# Patient Record
Sex: FEMALE | Race: WHITE | NOT HISPANIC OR LATINO | Employment: PART TIME | URBAN - METROPOLITAN AREA
[De-identification: names, ages, dates, MRNs, and addresses within clinical notes are randomized per-mention and may not be internally consistent; named-entity substitution may affect disease eponyms.]

---

## 2017-02-02 ENCOUNTER — HOSPITAL ENCOUNTER (EMERGENCY)
Facility: MEDICAL CENTER | Age: 67
End: 2017-02-02
Attending: EMERGENCY MEDICINE
Payer: MEDICARE

## 2017-02-02 VITALS
TEMPERATURE: 98.1 F | DIASTOLIC BLOOD PRESSURE: 82 MMHG | RESPIRATION RATE: 14 BRPM | SYSTOLIC BLOOD PRESSURE: 152 MMHG | WEIGHT: 227.51 LBS | HEART RATE: 63 BPM | OXYGEN SATURATION: 95 % | BODY MASS INDEX: 37.86 KG/M2

## 2017-02-02 DIAGNOSIS — T78.40XA ALLERGIC REACTION, INITIAL ENCOUNTER: ICD-10-CM

## 2017-02-02 PROCEDURE — 700111 HCHG RX REV CODE 636 W/ 250 OVERRIDE (IP): Performed by: EMERGENCY MEDICINE

## 2017-02-02 PROCEDURE — 99284 EMERGENCY DEPT VISIT MOD MDM: CPT

## 2017-02-02 RX ORDER — PREDNISONE 20 MG/1
60 TABLET ORAL DAILY
Qty: 15 TAB | Refills: 0 | Status: SHIPPED | OUTPATIENT
Start: 2017-02-02 | End: 2017-02-07

## 2017-02-02 RX ORDER — PREDNISONE 20 MG/1
60 TABLET ORAL DAILY
Status: DISCONTINUED | OUTPATIENT
Start: 2017-02-02 | End: 2017-02-02 | Stop reason: HOSPADM

## 2017-02-02 RX ADMIN — PREDNISONE 60 MG: 20 TABLET ORAL at 17:32

## 2017-02-02 ASSESSMENT — PAIN SCALES - GENERAL: PAINLEVEL_OUTOF10: 0

## 2017-02-02 NOTE — ED AVS SNAPSHOT
After Visit Summary                                                                                                                Cate Niño   MRN: [id number]    Department:  [facility], Emergency Dept   Date of Visit:  2/2/2017            [facility], Emergency Dept    [address] [address]    Phone:  [phone]      You were seen by     Chandan Soares M.D.      Your Diagnosis Was     Allergic reaction, initial encounter     [id number]       These are the medications you received during your hospitalization from 02/02/2017 1536 to 02/02/2017 1743     Date/Time Order Dose Route Action    02/02/2017 1732 predniSONE (DELTASONE) tablet 60 mg 60 mg Oral Given      Follow-up Information     1. Follow up with [facility], Emergency Dept.    Specialty:  Emergency Medicine    Why:  If symptoms worsen    Contact information    [address] [address] [phone]        2. Follow up with Beth Muse M.D.. Call in 1 day.    Specialty:  Family Medicine    Contact information    [address]  [facility] [address]  [phone]          3. Schedule an appointment as soon as possible for a visit with Terry Talbot M.D..    Specialty:  Allergy    Why:  allergist    Contact information    [address]  [facility] [address]  [phone]        Medication Information     Review all of your home medications and newly ordered medications with your primary doctor and/or pharmacist as soon as possible. Follow medication instructions as directed by your doctor and/or pharmacist.     Please keep your complete medication list with you and share with your physician. Update the information when medications are discontinued, doses are changed, or new medications (including over-the-counter products) are added; and carry medication information at all times in the event of emergency situations.               Medication List      START taking these medications        Instructions    predniSONE 20 MG Tabs   Commonly known as:  DELTASONE    Take 3 Tabs by mouth every day for 5 days.   Dose:  60 mg         ASK your doctor about these medications        Instructions    * azithromycin 250 MG Tabs   Commonly known as:  ZITHROMAX    Take 2 tabs on day 1 then 1 tab on day 2-5.       * azithromycin 250 MG Tabs   Commonly known as:  ZITHROMAX    Take 1 tab per day for 4 days       benzonatate 100 MG Caps   Commonly known as:  TESSALON    Take 1 Cap by mouth 2 times a day as needed for Cough.   Dose:  100 mg       fluticasone 110 MCG/ACT Aero   Commonly known as:  FLOVENT HFA    Inhale 2 Puffs by mouth 2 times a day.   Dose:  2 Puff       ipratropium-albuterol 0.5-2.5 (3) MG/3ML nebulizer solution   Commonly known as:  DUONEB    3 mL by Nebulization route 4 times a day.   Dose:  3 mL       lansoprazole 30 MG Cpdr   Commonly known as:  PREVACID    Take 30 mg by mouth every day.   Dose:  30 mg       * Notice:  This list has 2 medication(s) that are the same as other medications prescribed for you. Read the directions carefully, and ask your doctor or other care provider to review them with you.              Discharge Instructions       Allergies  An allergy is an abnormal reaction to a substance by the body's defense system (immune system). Allergies can develop at any age.  WHAT CAUSES ALLERGIES?  An allergic reaction happens when the immune system mistakenly reacts to a normally harmless substance, called an allergen, as if it were harmful. The immune system releases antibodies to fight the substance. Antibodies eventually release a chemical called histamine into the bloodstream. The release of histamine is meant to protect the body from infection, but it also causes discomfort.  An allergic reaction can be triggered by:  · Eating an allergen.  · Inhaling an allergen.  · Touching an allergen.  WHAT TYPES OF ALLERGIES ARE THERE?  There are many types  of allergies. Common types include:  · Seasonal allergies. People with this type of allergy are usually allergic to substances that are only present during certain seasons, such as molds and pollens.  · Food allergies.  · Drug allergies.  · Insect allergies.  · Animal dander allergies.  WHAT ARE SYMPTOMS OF ALLERGIES?  Possible allergy symptoms include:  · Swelling of the lips, face, tongue, mouth, or throat.  · Sneezing, coughing, or wheezing.  · Nasal congestion.  · Tingling in the mouth.  · Rash.  · Itching.  · Itchy, red, swollen areas of skin (hives).  · Watery eyes.  · Vomiting.  · Diarrhea.  · Dizziness.  · Lightheadedness.  · Fainting.  · Trouble breathing or swallowing.  · Chest tightness.  · Rapid heartbeat.  HOW ARE ALLERGIES DIAGNOSED?  Allergies are diagnosed with a medical and family history and one or more of the following:  · Skin tests.  · Blood tests.  · A food diary. A food diary is a record of all the foods and drinks you have in a day and of all the symptoms you experience.  · The results of an elimination diet. An elimination diet involves eliminating foods from your diet and then adding them back in one by one to find out if a certain food causes an allergic reaction.  HOW ARE ALLERGIES TREATED?  There is no cure for allergies, but allergic reactions can be treated with medicine. Severe reactions usually need to be treated at a hospital.  HOW CAN REACTIONS BE PREVENTED?  The best way to prevent an allergic reaction is by avoiding the substance you are allergic to. Allergy shots and medicines can also help prevent reactions in some cases. People with severe allergic reactions may be able to prevent a life-threatening reaction called anaphylaxis with a medicine given right after exposure to the allergen.     This information is not intended to replace advice given to you by your health care provider. Make sure you discuss any questions you have with your health care provider.     Document  Released: 03/12/2004 Document Revised: 01/08/2016 Document Reviewed: 09/29/2015  Elsevier Interactive Patient Education ©2016 Elsevier Inc.            Patient Information     Patient Information    Following emergency treatment: all patient requiring follow-up care must return either to a private physician or a clinic if your condition worsens before you are able to obtain further medical attention, please return to the emergency room.     Billing Information    At [facility], we work to make the billing process streamlined for our patients.  Our Representatives are here to answer any questions you may have regarding your hospital bill.  If you have insurance coverage and have supplied your insurance information to us, we will submit a claim to your insurer on your behalf.  Should you have any questions regarding your bill, we can be reached online or by phone as follows:  Online: You are able pay your bills online or live chat with our representatives about any billing questions you may have. We are here to help Monday - Friday from 8:00am to 7:30pm and 9:00am - 12:00pm on Saturdays.  Please visit [email or web address]  for more information.   Phone:  [phone] or [phone]    Please note that your emergency physician, surgeon, pathologist, radiologist, anesthesiologist, and other specialists are not employed by [facility] and will therefore bill separately for their services.  Please contact them directly for any questions concerning their bills at the numbers below:     Emergency Physician Services:  [phone]  [facility]:  [phone]  [facility]:  [phone]  [facility]:  [phone]    1. Your final bill may vary from the amount quoted upon discharge if all procedures are not complete at that time, or if your doctor has additional procedures of which we are not aware. You will receive an additional bill if you  return to the Emergency Department at Critical access hospital for suture removal regardless of the facility of which the sutures were placed.     2. Please arrange for settlement of this account at the emergency registration.    3. All self-pay accounts are due in full at the time of treatment.  If you are unable to meet this obligation then payment is expected within 4-5 days.     4. If you have had radiology studies (CT, X-ray, Ultrasound, MRI), you have received a preliminary result during your emergency department visit. Please contact the radiology department [phone] to receive a copy of your final result. Please discuss the Final result with your primary physician or with the follow up physician provided.     Crisis Hotline:  [place] Crisis Hotline:  7-144-WIMLUIP or 1-497.980.4837  [place] Crisis Hotline:    1-971.597.4599 or 352-674-5188         ED Discharge Follow Up Questions    1. In order to provide you with very good care, we would like to follow up with a phone call in the next few days.  May we have your permission to contact you?     YES /  NO    2. What is the best phone number to call you? (       )_____-__________    3. What is the best time to call you?      Morning  /  Afternoon  /  Evening                   Patient Signature:  ____________________________________________________________    Date:  ____________________________________________________________      Your appointments     Feb 03, 2017 10:00 AM   FLOYD LUIN10 with RB MG 2   [facility] ([address])    [address] [place] [id number]   [phone]           No deodorant, powder, perfume or lotion under the arm or breast area.            Feb 18, 2017  8:30 AM   Adult Draw/Collection with [facility]   [facility] (--)    [address] [place] [address]   [phone]

## 2017-02-02 NOTE — ED NOTES
Pt to triage with rash and lip swelling for weeks since taking Motrin daily. Pt advised to return to triage nurse for any changes or concerns.

## 2017-02-02 NOTE — ED AVS SNAPSHOT
[employer] Access Code: Activation code not generated  Current [employer] Status: Patient Declined    Your email address is not on file at [facility].  Email Addresses are required for you to sign up for [employer], please contact [phone] to verify your personal information and to provide your email address prior to attempting to register for [employer].    Cate JUAN Niño  [address] [place] [address]    [employer]  A secure, online tool to manage your health information     [facility]’s [employer]® is a secure, online tool that connects you to your personalized health information from the privacy of your home -- day or night - making it very easy for you to manage your healthcare. Once the activation process is completed, you can even access your medical information using the [employer] alesha, which is available for free in the Apple Alesha store or Google Play store.     To learn more about Quantum Group, visit [email or web address]    There are two levels of access available (as shown below):   My Chart Features  [facility] Primary Care Doctor [facility]  Specialists [facility]  Urgent  Care Non-[facility] Primary Care Doctor   Email your healthcare team securely and privately 24/7 X X X    Manage appointments: schedule your next appointment; view details of past/upcoming appointments X      Request prescription refills. X      View recent personal medical records, including lab and immunizations X X X X   View health record, including health history, allergies, medications X X X X   Read reports about your outpatient visits, procedures, consult and ER notes X X X X   See your discharge summary, which is a recap of your hospital and/or ER visit that includes your diagnosis, lab results, and care plan X X  X     How to register for [employer]:  Once your e-mail address has been verified, follow the following steps to sign up for [employer].     1. Go to  [email or web address]  2. Click on the Sign Up Now box, which takes you to the New  Member Sign Up page. You will need to provide the following information:  a. Enter your [employer] Access Code exactly as it appears at the top of this page. (You will not need to use this code after you’ve completed the sign-up process. If you do not sign up before the expiration date, you must request a new code.)   b. Enter your date of birth.   c. Enter your home email address.   d. Click Submit, and follow the next screen’s instructions.  3. Create a [employer]ia Her[employer] ID. This will be your [employer] login ID and cannot be changed, so think of one that is secure and easy to remember.  4. Create a [employer] password. You can change your password at any time.  5. Enter your Password Reset Question and Answer. This can be used at a later time if you forget your password.   6. Enter your e-mail address. This allows you to receive e-mail notifications when new information is available in [employer].  7. Click Sign Up. You can now view your health information.    For assistance activating your [employer] account, call [phone]

## 2017-02-02 NOTE — ED AVS SNAPSHOT
2/2/2017          Cate Niño  [address] [facility] [address]    Dear Cate:    [facility] wants to ensure your discharge home is safe and you or your loved ones have had all your questions answered regarding your care after you leave the hospital.    You may receive a telephone call within two days of your discharge.  This call is to make certain you understand your discharge instructions as well as ensure we provided you with the best care possible during your stay with us.     The call will only last approximately 3-5 minutes and will be done by a nurse.    Once again, we want to ensure your discharge home is safe and that you have a clear understanding of any next steps in your care.  If you have any questions or concerns, please do not hesitate to contact us, we are here for you.  Thank you for choosing [facility] for your healthcare needs.    Sincerely,    Ace Celestin    [facility]

## 2017-02-03 ENCOUNTER — HOSPITAL ENCOUNTER (OUTPATIENT)
Dept: RADIOLOGY | Facility: MEDICAL CENTER | Age: 67
End: 2017-02-03
Attending: FAMILY MEDICINE
Payer: MEDICARE

## 2017-02-03 DIAGNOSIS — Z12.31 SCREENING MAMMOGRAM, ENCOUNTER FOR: ICD-10-CM

## 2017-02-03 PROCEDURE — 77063 BREAST TOMOSYNTHESIS BI: CPT

## 2017-02-03 NOTE — ED PROVIDER NOTES
ED Provider Note    Scribed for Chandan Soares M.D. by Lauren Valencia. 2/2/2017, 5:20 PM.    Primary care provider: Beth Muse M.D.  Means of arrival: walk in   History obtained from: Patient  History limited by: None    CHIEF COMPLAINT  Chief Complaint   Patient presents with   • Medication Reaction   • Lip Swelling     HPI  Cate Niño is a 66 y.o. female who presents to the Emergency Department for lip swelling onset 1pm and a rash that onset three weeks ago. The patient was sent to the ED by her MD today.  She notes that her rash is very itchy and constant. The patient was unsure what was causing her rash and swelling. She denies any shortness of breath, nausea or vomiting. She notes that she worked on eliminating new soaps and detergents, but this has not helped her rash subside.  The only thing she notes taking on a daily basis is Motrin but she states that there was no bottle change for three months.  She denies any new foods or past allergic reactions.     REVIEW OF SYSTEMS  Pertinent positives include rash, lip swelling.   Pertinent negatives include no shortness of breath, nausea, or vomiting.      PAST MEDICAL HISTORY    No chronic medical problems    SURGICAL HISTORY  patient denies any surgical history    SOCIAL HISTORY  Social History   Substance Use Topics   • Smoking status: Never Smoker    • Alcohol Use: No      History   Drug Use No     FAMILY HISTORY  No family history noted    CURRENT MEDICATIONS  No current facility-administered medications on file prior to encounter.     Current Outpatient Prescriptions on File Prior to Encounter   Medication Sig Dispense Refill   • azithromycin (ZITHROMAX) 250 MG TABS Take 2 tabs on day 1 then 1 tab on day 2-5. 6 Tab 0   • benzonatate (TESSALON) 100 MG CAPS Take 1 Cap by mouth 2 times a day as needed for Cough. 20 Cap 0   • azithromycin (ZITHROMAX) 250 MG TABS Take 1 tab per day for 4 days 4 Tab 0   • lansoprazole (PREVACID) 30 MG  CPDR Take 30 mg by mouth every day.     • ipratropium-albuterol (DUONEB) 0.5-2.5 (3) MG/3ML nebulizer solution 3 mL by Nebulization route 4 times a day.     • fluticasone (FLOVENT HFA) 110 MCG/ACT AERO Inhale 2 Puffs by mouth 2 times a day.       ALLERGIES  No Known Allergies    PHYSICAL EXAM  VITAL SIGNS: /69 mmHg  Pulse 75  Temp(Src) 36.4 °C (97.5 °F)  Resp 16  Wt 103.2 kg (227 lb 8.2 oz)  SpO2 95%    Nursing note and vitals reviewed.  Constitutional: No distress.   HENT: Head is atraumatic. Oropharynx is moist. Minimal swelling upper lip, no lower lip swelling, no tongue swelling  Eyes: Conjunctivae are normal. Pupils are equal, round, and reactive to light.   Cardiovascular: Normal peripheral perfusion. Regular rate and rhythm, no audible murmur  Respiratory: No respiratory distress. Clear to auscultation bilaterally. No rales, rhonchi, or wheezing.  Musculoskeletal: Normal range of motion.   Neurological: Alert. No focal deficits noted.    Skin:  Scattered urticaria about the extremities, torso and upper thighs  Psych: Appropriate for clinical situation     COURSE & MEDICAL DECISION MAKING  Nursing notes, VS, PMSFHx reviewed in chart.    5:20 PM - Patient seen and examined at bedside. Patient will be treated with Deltasone 60mg tablet.  I discussed plans for discharge with a prescription for Deltasone 20mg tablet. She was given a referral to follow up with her primary care provider and an allergist and instructed to return to the ED if her symptoms worsen. Patient understands and agrees. Her vitals prior to discharge are: /69 mmHg  Pulse 75  Temp(Src) 36.4 °C (97.5 °F)  Resp 16  Wt 103.2 kg (227 lb 8.2 oz)  SpO2 95%    The patient will return for new or worsening symptoms and is stable at the time of discharge.    The patient is referred to a primary physician for blood pressure management, diabetic screening, and for all other preventative health concerns.    DISPOSITION:  Patient will be  discharged home in stable condition.    FOLLOW UP:  [facility], Emergency Dept  [address]  [place] [id number] [phone]    If symptoms worsen    Beth Muse M.D.  [address]  [address]  [facility] [address]  [phone]    Call in 1 day      Terry Talbot M.D.  [address]  [facility] [address]  [phone]    Schedule an appointment as soon as possible for a visit  allergist      OUTPATIENT MEDICATIONS:  New Prescriptions    PREDNISONE (DELTASONE) 20 MG TAB    Take 3 Tabs by mouth every day for 5 days.     The patient was discharged home with an information sheet on allergic reaction and told to return immediately for any signs or symptoms listed.  The patient agreed to the discharge precautions and follow-up plan which is documented in EPIC.    FINAL IMPRESSION  1. Allergic reaction, initial encounter         The note accurately reflects work and decisions made by me.  Chandan Soares  2/2/2017  6:43 PM

## 2017-02-03 NOTE — DISCHARGE INSTRUCTIONS
Allergies  An allergy is an abnormal reaction to a substance by the body's defense system (immune system). Allergies can develop at any age.  WHAT CAUSES ALLERGIES?  An allergic reaction happens when the immune system mistakenly reacts to a normally harmless substance, called an allergen, as if it were harmful. The immune system releases antibodies to fight the substance. Antibodies eventually release a chemical called histamine into the bloodstream. The release of histamine is meant to protect the body from infection, but it also causes discomfort.  An allergic reaction can be triggered by:  · Eating an allergen.  · Inhaling an allergen.  · Touching an allergen.  WHAT TYPES OF ALLERGIES ARE THERE?  There are many types of allergies. Common types include:  · Seasonal allergies. People with this type of allergy are usually allergic to substances that are only present during certain seasons, such as molds and pollens.  · Food allergies.  · Drug allergies.  · Insect allergies.  · Animal dander allergies.  WHAT ARE SYMPTOMS OF ALLERGIES?  Possible allergy symptoms include:  · Swelling of the lips, face, tongue, mouth, or throat.  · Sneezing, coughing, or wheezing.  · Nasal congestion.  · Tingling in the mouth.  · Rash.  · Itching.  · Itchy, red, swollen areas of skin (hives).  · Watery eyes.  · Vomiting.  · Diarrhea.  · Dizziness.  · Lightheadedness.  · Fainting.  · Trouble breathing or swallowing.  · Chest tightness.  · Rapid heartbeat.  HOW ARE ALLERGIES DIAGNOSED?  Allergies are diagnosed with a medical and family history and one or more of the following:  · Skin tests.  · Blood tests.  · A food diary. A food diary is a record of all the foods and drinks you have in a day and of all the symptoms you experience.  · The results of an elimination diet. An elimination diet involves eliminating foods from your diet and then adding them back in one by one to find out if a certain food causes an allergic reaction.  HOW ARE  ALLERGIES TREATED?  There is no cure for allergies, but allergic reactions can be treated with medicine. Severe reactions usually need to be treated at a hospital.  HOW CAN REACTIONS BE PREVENTED?  The best way to prevent an allergic reaction is by avoiding the substance you are allergic to. Allergy shots and medicines can also help prevent reactions in some cases. People with severe allergic reactions may be able to prevent a life-threatening reaction called anaphylaxis with a medicine given right after exposure to the allergen.     This information is not intended to replace advice given to you by your health care provider. Make sure you discuss any questions you have with your health care provider.     Document Released: 03/12/2004 Document Revised: 01/08/2016 Document Reviewed: 09/29/2015  [employer] Interactive Patient Education ©2016 Elsevier Inc.

## 2017-02-03 NOTE — PROGRESS NOTES
Pt left unit walking independently; Personal belongings with pt when leaving unit. Pt given discharge instructions prior to leaving unit including prescription and when to visit with physician; Encouraged to return to ED if symptoms worsen; pt  verbalizes understanding. Copy of discharge instructions with pt and in the chart.

## 2017-02-18 ENCOUNTER — HOSPITAL ENCOUNTER (OUTPATIENT)
Dept: LAB | Facility: MEDICAL CENTER | Age: 67
End: 2017-02-18
Attending: FAMILY MEDICINE
Payer: MEDICARE

## 2017-02-18 LAB
ALBUMIN SERPL BCP-MCNC: 3.7 G/DL (ref 3.2–4.9)
ALBUMIN/GLOB SERPL: 1.2 G/DL
ALP SERPL-CCNC: 62 U/L (ref 30–99)
ALT SERPL-CCNC: 16 U/L (ref 2–50)
ANION GAP SERPL CALC-SCNC: 9 MMOL/L (ref 0–11.9)
APPEARANCE UR: CLEAR
AST SERPL-CCNC: 16 U/L (ref 12–45)
BASOPHILS # BLD AUTO: 0.05 K/UL (ref 0–0.12)
BASOPHILS NFR BLD AUTO: 0.8 % (ref 0–1.8)
BILIRUB SERPL-MCNC: 0.8 MG/DL (ref 0.1–1.5)
BILIRUB UR QL STRIP.AUTO: NEGATIVE
BUN SERPL-MCNC: 17 MG/DL (ref 8–22)
CALCIUM SERPL-MCNC: 9 MG/DL (ref 8.5–10.5)
CHLORIDE SERPL-SCNC: 108 MMOL/L (ref 96–112)
CHOLEST SERPL-MCNC: 221 MG/DL (ref 100–199)
CO2 SERPL-SCNC: 26 MMOL/L (ref 20–33)
COLOR UR AUTO: ABNORMAL
CREAT SERPL-MCNC: 0.68 MG/DL (ref 0.5–1.4)
CULTURE IF INDICATED INDCX: YES UA CULTURE
EOSINOPHIL # BLD: 0 K/UL (ref 0–0.51)
EOSINOPHIL NFR BLD AUTO: 0 % (ref 0–6.9)
EPITHELIAL CELLS 1715: ABNORMAL /HPF
ERYTHROCYTE [DISTWIDTH] IN BLOOD BY AUTOMATED COUNT: 40.4 FL (ref 35.9–50)
GLOBULIN SER CALC-MCNC: 3.1 G/DL (ref 1.9–3.5)
GLUCOSE SERPL-MCNC: 95 MG/DL (ref 65–99)
GLUCOSE UR STRIP.AUTO-MCNC: NEGATIVE MG/DL
HCT VFR BLD AUTO: 45.4 % (ref 37–47)
HDLC SERPL-MCNC: 48 MG/DL
HGB BLD-MCNC: 14.6 G/DL (ref 12–16)
IMM GRANULOCYTES # BLD AUTO: 0.03 K/UL (ref 0–0.11)
IMM GRANULOCYTES NFR BLD AUTO: 0.5 % (ref 0–0.9)
KETONES UR STRIP.AUTO-MCNC: NEGATIVE MG/DL
LDLC SERPL CALC-MCNC: 141 MG/DL
LEUKOCYTE ESTERASE UR QL STRIP.AUTO: ABNORMAL
LYMPHOCYTES # BLD: 2.23 K/UL (ref 1–4.8)
LYMPHOCYTES NFR BLD AUTO: 36 % (ref 22–41)
MCH RBC QN AUTO: 29.7 PG (ref 27–33)
MCHC RBC AUTO-ENTMCNC: 32.2 G/DL (ref 33.6–35)
MCV RBC AUTO: 92.3 FL (ref 81.4–97.8)
MICRO URNS: ABNORMAL
MONOCYTES # BLD: 0.5 K/UL (ref 0–0.85)
MONOCYTES NFR BLD AUTO: 8.1 % (ref 0–13.4)
NEUTROPHILS # BLD: 3.38 K/UL (ref 2–7.15)
NEUTROPHILS NFR BLD AUTO: 54.6 % (ref 44–72)
NITRITE UR QL STRIP.AUTO: NEGATIVE
NRBC # BLD AUTO: 0 K/UL
NRBC BLD-RTO: 0 /100 WBC
PH UR: 6.5 [PH]
PLATELET # BLD AUTO: 245 K/UL (ref 164–446)
PMV BLD AUTO: 9.6 FL (ref 9–12.9)
POTASSIUM SERPL-SCNC: 4.1 MMOL/L (ref 3.6–5.5)
PROT SERPL-MCNC: 6.8 G/DL (ref 6–8.2)
PROT UR QL STRIP: NEGATIVE MG/DL
RBC # BLD AUTO: 4.92 M/UL (ref 4.2–5.4)
RBC #/AREA URNS HPF: ABNORMAL /HPF
RBC UR QL AUTO: NEGATIVE
SODIUM SERPL-SCNC: 143 MMOL/L (ref 135–145)
SP GR UR STRIP.AUTO: 1.02
TRIGL SERPL-MCNC: 158 MG/DL (ref 0–149)
TSH SERPL DL<=0.005 MIU/L-ACNC: 3 UIU/ML (ref 0.3–3.7)
WBC # BLD AUTO: 6.2 K/UL (ref 4.8–10.8)
WBC #/AREA URNS HPF: ABNORMAL /HPF

## 2017-02-18 PROCEDURE — 87086 URINE CULTURE/COLONY COUNT: CPT

## 2017-02-18 PROCEDURE — 85025 COMPLETE CBC W/AUTO DIFF WBC: CPT

## 2017-02-18 PROCEDURE — 84443 ASSAY THYROID STIM HORMONE: CPT

## 2017-02-18 PROCEDURE — 80061 LIPID PANEL: CPT

## 2017-02-18 PROCEDURE — 81001 URINALYSIS AUTO W/SCOPE: CPT

## 2017-02-18 PROCEDURE — 80053 COMPREHEN METABOLIC PANEL: CPT

## 2017-02-18 PROCEDURE — 36415 COLL VENOUS BLD VENIPUNCTURE: CPT

## 2017-02-20 LAB
BACTERIA UR CULT: ABNORMAL
BACTERIA UR CULT: ABNORMAL
SIGNIFICANT IND 70042: ABNORMAL
SOURCE SOURCE: ABNORMAL

## 2017-03-27 ENCOUNTER — HOSPITAL ENCOUNTER (OUTPATIENT)
Dept: LAB | Facility: MEDICAL CENTER | Age: 67
End: 2017-03-27
Attending: NURSE PRACTITIONER
Payer: MEDICARE

## 2017-03-27 LAB
ALBUMIN SERPL BCP-MCNC: 4.1 G/DL (ref 3.2–4.9)
ALBUMIN/GLOB SERPL: 1.1 G/DL
ALP SERPL-CCNC: 69 U/L (ref 30–99)
ALT SERPL-CCNC: 15 U/L (ref 2–50)
ANION GAP SERPL CALC-SCNC: 8 MMOL/L (ref 0–11.9)
APPEARANCE UR: CLEAR
AST SERPL-CCNC: 16 U/L (ref 12–45)
BASOPHILS # BLD AUTO: 0.03 K/UL (ref 0–0.12)
BASOPHILS NFR BLD AUTO: 0.4 % (ref 0–1.8)
BILIRUB SERPL-MCNC: 0.7 MG/DL (ref 0.1–1.5)
BILIRUB UR QL STRIP.AUTO: NEGATIVE
BUN SERPL-MCNC: 17 MG/DL (ref 8–22)
CALCIUM SERPL-MCNC: 9.6 MG/DL (ref 8.5–10.5)
CHLORIDE SERPL-SCNC: 103 MMOL/L (ref 96–112)
CO2 SERPL-SCNC: 25 MMOL/L (ref 20–33)
COLOR UR AUTO: NORMAL
CREAT SERPL-MCNC: 0.73 MG/DL (ref 0.5–1.4)
CULTURE IF INDICATED INDCX: NO UA CULTURE
EOSINOPHIL # BLD: 0 K/UL (ref 0–0.51)
EOSINOPHIL NFR BLD AUTO: 0 % (ref 0–6.9)
ERYTHROCYTE [DISTWIDTH] IN BLOOD BY AUTOMATED COUNT: 40.2 FL (ref 35.9–50)
ERYTHROCYTE [SEDIMENTATION RATE] IN BLOOD BY WESTERGREN METHOD: 22 MM/HOUR (ref 0–30)
GLOBULIN SER CALC-MCNC: 3.9 G/DL (ref 1.9–3.5)
GLUCOSE SERPL-MCNC: 92 MG/DL (ref 65–99)
GLUCOSE UR STRIP.AUTO-MCNC: NEGATIVE MG/DL
HCT VFR BLD AUTO: 47.7 % (ref 37–47)
HGB BLD-MCNC: 15.2 G/DL (ref 12–16)
IMM GRANULOCYTES # BLD AUTO: 0.03 K/UL (ref 0–0.11)
IMM GRANULOCYTES NFR BLD AUTO: 0.4 % (ref 0–0.9)
KETONES UR STRIP.AUTO-MCNC: NEGATIVE MG/DL
LEUKOCYTE ESTERASE UR QL STRIP.AUTO: NEGATIVE
LYMPHOCYTES # BLD: 2.17 K/UL (ref 1–4.8)
LYMPHOCYTES NFR BLD AUTO: 27.9 % (ref 22–41)
MCH RBC QN AUTO: 29.5 PG (ref 27–33)
MCHC RBC AUTO-ENTMCNC: 31.9 G/DL (ref 33.6–35)
MCV RBC AUTO: 92.4 FL (ref 81.4–97.8)
MICRO URNS: NORMAL
MONOCYTES # BLD: 0.62 K/UL (ref 0–0.85)
MONOCYTES NFR BLD AUTO: 8 % (ref 0–13.4)
NEUTROPHILS # BLD: 4.94 K/UL (ref 2–7.15)
NEUTROPHILS NFR BLD AUTO: 63.3 % (ref 44–72)
NITRITE UR QL STRIP.AUTO: NEGATIVE
NRBC # BLD AUTO: 0 K/UL
NRBC BLD-RTO: 0 /100 WBC
PH UR: 6 [PH]
PLATELET # BLD AUTO: 287 K/UL (ref 164–446)
PMV BLD AUTO: 9.4 FL (ref 9–12.9)
POTASSIUM SERPL-SCNC: 3.8 MMOL/L (ref 3.6–5.5)
PROT SERPL-MCNC: 8 G/DL (ref 6–8.2)
PROT UR QL STRIP: NEGATIVE MG/DL
RBC # BLD AUTO: 5.16 M/UL (ref 4.2–5.4)
RBC UR QL AUTO: NEGATIVE
SODIUM SERPL-SCNC: 136 MMOL/L (ref 135–145)
SP GR UR STRIP.AUTO: 1.01
TSH SERPL DL<=0.005 MIU/L-ACNC: 3.15 UIU/ML (ref 0.3–3.7)
WBC # BLD AUTO: 7.8 K/UL (ref 4.8–10.8)

## 2017-03-27 PROCEDURE — 36415 COLL VENOUS BLD VENIPUNCTURE: CPT

## 2017-03-27 PROCEDURE — 86225 DNA ANTIBODY NATIVE: CPT

## 2017-03-27 PROCEDURE — 81003 URINALYSIS AUTO W/O SCOPE: CPT

## 2017-03-27 PROCEDURE — 86235 NUCLEAR ANTIGEN ANTIBODY: CPT | Mod: 91

## 2017-03-27 PROCEDURE — 85652 RBC SED RATE AUTOMATED: CPT

## 2017-03-27 PROCEDURE — 85025 COMPLETE CBC W/AUTO DIFF WBC: CPT

## 2017-03-27 PROCEDURE — 86038 ANTINUCLEAR ANTIBODIES: CPT

## 2017-03-27 PROCEDURE — 84443 ASSAY THYROID STIM HORMONE: CPT

## 2017-03-27 PROCEDURE — 80053 COMPREHEN METABOLIC PANEL: CPT

## 2017-03-29 LAB — NUCLEAR IGG SER QL IA: NORMAL

## 2017-05-24 ENCOUNTER — HOSPITAL ENCOUNTER (OUTPATIENT)
Dept: RADIOLOGY | Facility: MEDICAL CENTER | Age: 67
End: 2017-05-24
Attending: FAMILY MEDICINE
Payer: MEDICARE

## 2017-05-24 DIAGNOSIS — S40.852S: ICD-10-CM

## 2017-05-24 DIAGNOSIS — L08.9: ICD-10-CM

## 2017-05-25 ENCOUNTER — HOSPITAL ENCOUNTER (OUTPATIENT)
Dept: RADIOLOGY | Facility: MEDICAL CENTER | Age: 67
End: 2017-05-25
Attending: FAMILY MEDICINE
Payer: MEDICARE

## 2017-05-25 DIAGNOSIS — L08.9: ICD-10-CM

## 2017-05-25 DIAGNOSIS — S40.852S: ICD-10-CM

## 2017-05-25 PROCEDURE — 73080 X-RAY EXAM OF ELBOW: CPT | Mod: LT

## 2017-07-25 ENCOUNTER — HOSPITAL ENCOUNTER (OUTPATIENT)
Dept: RADIOLOGY | Facility: MEDICAL CENTER | Age: 67
End: 2017-07-25
Attending: FAMILY MEDICINE
Payer: MEDICARE

## 2017-07-25 ENCOUNTER — HOSPITAL ENCOUNTER (OUTPATIENT)
Facility: MEDICAL CENTER | Age: 67
End: 2017-07-25
Attending: FAMILY MEDICINE
Payer: MEDICARE

## 2017-07-25 DIAGNOSIS — M54.5 LOW BACK PAIN, UNSPECIFIED BACK PAIN LATERALITY, UNSPECIFIED CHRONICITY, WITH SCIATICA PRESENCE UNSPECIFIED: ICD-10-CM

## 2017-07-25 LAB — CYTOLOGY REG CYTOL: NORMAL

## 2017-07-25 PROCEDURE — 72110 X-RAY EXAM L-2 SPINE 4/>VWS: CPT

## 2017-07-25 PROCEDURE — 88175 CYTOPATH C/V AUTO FLUID REDO: CPT

## 2017-08-02 ENCOUNTER — HOSPITAL ENCOUNTER (OUTPATIENT)
Dept: RADIOLOGY | Facility: MEDICAL CENTER | Age: 67
End: 2017-08-02
Attending: FAMILY MEDICINE
Payer: MEDICARE

## 2017-08-02 DIAGNOSIS — M25.522 LEFT ELBOW PAIN: ICD-10-CM

## 2017-08-02 PROCEDURE — 76882 US LMTD JT/FCL EVL NVASC XTR: CPT | Mod: LT

## 2018-10-03 ENCOUNTER — HOSPITAL ENCOUNTER (OUTPATIENT)
Dept: RADIOLOGY | Facility: MEDICAL CENTER | Age: 68
End: 2018-10-03
Attending: FAMILY MEDICINE
Payer: MEDICARE

## 2018-10-03 ENCOUNTER — HOSPITAL ENCOUNTER (OUTPATIENT)
Dept: LAB | Facility: MEDICAL CENTER | Age: 68
End: 2018-10-03
Attending: FAMILY MEDICINE
Payer: MEDICARE

## 2018-10-03 DIAGNOSIS — Z78.0 POST-MENOPAUSAL: ICD-10-CM

## 2018-10-03 DIAGNOSIS — Z12.31 VISIT FOR SCREENING MAMMOGRAM: ICD-10-CM

## 2018-10-03 LAB
25(OH)D3 SERPL-MCNC: 16 NG/ML (ref 30–100)
ALBUMIN SERPL BCP-MCNC: 4.2 G/DL (ref 3.2–4.9)
ALBUMIN/GLOB SERPL: 1.2 G/DL
ALP SERPL-CCNC: 69 U/L (ref 30–99)
ALT SERPL-CCNC: 26 U/L (ref 2–50)
ANION GAP SERPL CALC-SCNC: 6 MMOL/L (ref 0–11.9)
AST SERPL-CCNC: 25 U/L (ref 12–45)
BASOPHILS # BLD AUTO: 0.3 % (ref 0–1.8)
BASOPHILS # BLD: 0.02 K/UL (ref 0–0.12)
BILIRUB SERPL-MCNC: 0.9 MG/DL (ref 0.1–1.5)
BUN SERPL-MCNC: 17 MG/DL (ref 8–22)
CALCIUM SERPL-MCNC: 9.7 MG/DL (ref 8.5–10.5)
CHLORIDE SERPL-SCNC: 106 MMOL/L (ref 96–112)
CHOLEST SERPL-MCNC: 217 MG/DL (ref 100–199)
CO2 SERPL-SCNC: 27 MMOL/L (ref 20–33)
CREAT SERPL-MCNC: 0.72 MG/DL (ref 0.5–1.4)
EOSINOPHIL # BLD AUTO: 0 K/UL (ref 0–0.51)
EOSINOPHIL NFR BLD: 0 % (ref 0–6.9)
ERYTHROCYTE [DISTWIDTH] IN BLOOD BY AUTOMATED COUNT: 42.3 FL (ref 35.9–50)
EST. AVERAGE GLUCOSE BLD GHB EST-MCNC: 126 MG/DL
FASTING STATUS PATIENT QL REPORTED: NORMAL
GLOBULIN SER CALC-MCNC: 3.6 G/DL (ref 1.9–3.5)
GLUCOSE SERPL-MCNC: 92 MG/DL (ref 65–99)
HBA1C MFR BLD: 6 % (ref 0–5.6)
HCT VFR BLD AUTO: 45.5 % (ref 37–47)
HDLC SERPL-MCNC: 51 MG/DL
HGB BLD-MCNC: 14.7 G/DL (ref 12–16)
IMM GRANULOCYTES # BLD AUTO: 0.03 K/UL (ref 0–0.11)
IMM GRANULOCYTES NFR BLD AUTO: 0.5 % (ref 0–0.9)
LDLC SERPL CALC-MCNC: 140 MG/DL
LYMPHOCYTES # BLD AUTO: 1.84 K/UL (ref 1–4.8)
LYMPHOCYTES NFR BLD: 30.7 % (ref 22–41)
MCH RBC QN AUTO: 30.2 PG (ref 27–33)
MCHC RBC AUTO-ENTMCNC: 32.3 G/DL (ref 33.6–35)
MCV RBC AUTO: 93.4 FL (ref 81.4–97.8)
MONOCYTES # BLD AUTO: 0.5 K/UL (ref 0–0.85)
MONOCYTES NFR BLD AUTO: 8.3 % (ref 0–13.4)
NEUTROPHILS # BLD AUTO: 3.61 K/UL (ref 2–7.15)
NEUTROPHILS NFR BLD: 60.2 % (ref 44–72)
NRBC # BLD AUTO: 0 K/UL
NRBC BLD-RTO: 0 /100 WBC
PLATELET # BLD AUTO: 260 K/UL (ref 164–446)
PMV BLD AUTO: 9.8 FL (ref 9–12.9)
POTASSIUM SERPL-SCNC: 4.2 MMOL/L (ref 3.6–5.5)
PROT SERPL-MCNC: 7.8 G/DL (ref 6–8.2)
RBC # BLD AUTO: 4.87 M/UL (ref 4.2–5.4)
SODIUM SERPL-SCNC: 139 MMOL/L (ref 135–145)
TRIGL SERPL-MCNC: 131 MG/DL (ref 0–149)
TSH SERPL DL<=0.005 MIU/L-ACNC: 3.32 UIU/ML (ref 0.38–5.33)
WBC # BLD AUTO: 6 K/UL (ref 4.8–10.8)

## 2018-10-03 PROCEDURE — 85025 COMPLETE CBC W/AUTO DIFF WBC: CPT

## 2018-10-03 PROCEDURE — 77067 SCR MAMMO BI INCL CAD: CPT

## 2018-10-03 PROCEDURE — 80061 LIPID PANEL: CPT

## 2018-10-03 PROCEDURE — 36415 COLL VENOUS BLD VENIPUNCTURE: CPT

## 2018-10-03 PROCEDURE — 84443 ASSAY THYROID STIM HORMONE: CPT

## 2018-10-03 PROCEDURE — 83036 HEMOGLOBIN GLYCOSYLATED A1C: CPT | Mod: GZ

## 2018-10-03 PROCEDURE — 82306 VITAMIN D 25 HYDROXY: CPT

## 2018-10-03 PROCEDURE — 80053 COMPREHEN METABOLIC PANEL: CPT

## 2018-10-05 ENCOUNTER — HOSPITAL ENCOUNTER (OUTPATIENT)
Dept: RADIOLOGY | Facility: MEDICAL CENTER | Age: 68
End: 2018-10-05
Attending: FAMILY MEDICINE
Payer: MEDICARE

## 2018-10-05 DIAGNOSIS — R92.8 ABNORMAL MAMMOGRAM: ICD-10-CM

## 2018-10-05 PROCEDURE — 77065 DX MAMMO INCL CAD UNI: CPT | Mod: LT

## 2018-10-05 PROCEDURE — 77080 DXA BONE DENSITY AXIAL: CPT

## 2018-10-26 ENCOUNTER — HOSPITAL ENCOUNTER (OUTPATIENT)
Facility: MEDICAL CENTER | Age: 68
End: 2018-10-26
Attending: FAMILY MEDICINE
Payer: MEDICARE

## 2018-10-26 PROCEDURE — 87338 HPYLORI STOOL AG IA: CPT

## 2018-10-27 LAB — H PYLORI AG STL QL IA: NOT DETECTED

## 2019-10-04 ENCOUNTER — HOSPITAL ENCOUNTER (OUTPATIENT)
Dept: RADIOLOGY | Facility: MEDICAL CENTER | Age: 69
End: 2019-10-04
Attending: FAMILY MEDICINE
Payer: MEDICARE

## 2019-10-04 DIAGNOSIS — Z12.31 SCREENING MAMMOGRAM, ENCOUNTER FOR: ICD-10-CM

## 2019-10-04 PROCEDURE — 77063 BREAST TOMOSYNTHESIS BI: CPT

## 2019-10-05 ENCOUNTER — HOSPITAL ENCOUNTER (OUTPATIENT)
Dept: LAB | Facility: MEDICAL CENTER | Age: 69
End: 2019-10-05
Attending: FAMILY MEDICINE
Payer: MEDICARE

## 2019-10-05 LAB
25(OH)D3 SERPL-MCNC: 14 NG/ML (ref 30–100)
ALBUMIN SERPL BCP-MCNC: 4.3 G/DL (ref 3.2–4.9)
ALBUMIN/GLOB SERPL: 1.3 G/DL
ALP SERPL-CCNC: 70 U/L (ref 30–99)
ALT SERPL-CCNC: 17 U/L (ref 2–50)
ANION GAP SERPL CALC-SCNC: 10 MMOL/L (ref 0–11.9)
AST SERPL-CCNC: 18 U/L (ref 12–45)
BASOPHILS # BLD AUTO: 0.6 % (ref 0–1.8)
BASOPHILS # BLD: 0.05 K/UL (ref 0–0.12)
BILIRUB SERPL-MCNC: 0.9 MG/DL (ref 0.1–1.5)
BUN SERPL-MCNC: 15 MG/DL (ref 8–22)
CALCIUM SERPL-MCNC: 9.6 MG/DL (ref 8.5–10.5)
CHLORIDE SERPL-SCNC: 106 MMOL/L (ref 96–112)
CHOLEST SERPL-MCNC: 238 MG/DL (ref 100–199)
CO2 SERPL-SCNC: 28 MMOL/L (ref 20–33)
CREAT SERPL-MCNC: 0.71 MG/DL (ref 0.5–1.4)
EOSINOPHIL # BLD AUTO: 0 K/UL (ref 0–0.51)
EOSINOPHIL NFR BLD: 0 % (ref 0–6.9)
ERYTHROCYTE [DISTWIDTH] IN BLOOD BY AUTOMATED COUNT: 42.3 FL (ref 35.9–50)
GLOBULIN SER CALC-MCNC: 3.4 G/DL (ref 1.9–3.5)
GLUCOSE SERPL-MCNC: 91 MG/DL (ref 65–99)
HCT VFR BLD AUTO: 49.3 % (ref 37–47)
HDLC SERPL-MCNC: 49 MG/DL
HGB BLD-MCNC: 15.3 G/DL (ref 12–16)
IMM GRANULOCYTES # BLD AUTO: 0.03 K/UL (ref 0–0.11)
IMM GRANULOCYTES NFR BLD AUTO: 0.4 % (ref 0–0.9)
LDLC SERPL CALC-MCNC: 154 MG/DL
LYMPHOCYTES # BLD AUTO: 2.6 K/UL (ref 1–4.8)
LYMPHOCYTES NFR BLD: 33.4 % (ref 22–41)
MCH RBC QN AUTO: 29.3 PG (ref 27–33)
MCHC RBC AUTO-ENTMCNC: 31 G/DL (ref 33.6–35)
MCV RBC AUTO: 94.3 FL (ref 81.4–97.8)
MONOCYTES # BLD AUTO: 0.61 K/UL (ref 0–0.85)
MONOCYTES NFR BLD AUTO: 7.8 % (ref 0–13.4)
NEUTROPHILS # BLD AUTO: 4.49 K/UL (ref 2–7.15)
NEUTROPHILS NFR BLD: 57.8 % (ref 44–72)
NRBC # BLD AUTO: 0 K/UL
NRBC BLD-RTO: 0 /100 WBC
PLATELET # BLD AUTO: 273 K/UL (ref 164–446)
PMV BLD AUTO: 9.4 FL (ref 9–12.9)
POTASSIUM SERPL-SCNC: 4.3 MMOL/L (ref 3.6–5.5)
PROT SERPL-MCNC: 7.7 G/DL (ref 6–8.2)
RBC # BLD AUTO: 5.23 M/UL (ref 4.2–5.4)
SODIUM SERPL-SCNC: 144 MMOL/L (ref 135–145)
TRIGL SERPL-MCNC: 176 MG/DL (ref 0–149)
TSH SERPL DL<=0.005 MIU/L-ACNC: 2.37 UIU/ML (ref 0.38–5.33)
WBC # BLD AUTO: 7.8 K/UL (ref 4.8–10.8)

## 2019-10-05 PROCEDURE — 80053 COMPREHEN METABOLIC PANEL: CPT

## 2019-10-05 PROCEDURE — 84443 ASSAY THYROID STIM HORMONE: CPT

## 2019-10-05 PROCEDURE — 82306 VITAMIN D 25 HYDROXY: CPT

## 2019-10-05 PROCEDURE — 36415 COLL VENOUS BLD VENIPUNCTURE: CPT

## 2019-10-05 PROCEDURE — 80061 LIPID PANEL: CPT

## 2019-10-05 PROCEDURE — 85025 COMPLETE CBC W/AUTO DIFF WBC: CPT

## 2019-10-08 ENCOUNTER — HOSPITAL ENCOUNTER (OUTPATIENT)
Facility: MEDICAL CENTER | Age: 69
End: 2019-10-08
Attending: FAMILY MEDICINE
Payer: MEDICARE

## 2019-10-08 PROCEDURE — 87338 HPYLORI STOOL AG IA: CPT

## 2019-10-09 ENCOUNTER — HOSPITAL ENCOUNTER (OUTPATIENT)
Facility: MEDICAL CENTER | Age: 69
End: 2019-10-09
Attending: FAMILY MEDICINE
Payer: MEDICARE

## 2019-10-09 LAB — H PYLORI AG STL QL IA: NOT DETECTED

## 2020-10-08 ENCOUNTER — HOSPITAL ENCOUNTER (OUTPATIENT)
Dept: RADIOLOGY | Facility: MEDICAL CENTER | Age: 70
End: 2020-10-08
Attending: FAMILY MEDICINE
Payer: MEDICARE

## 2020-10-08 DIAGNOSIS — Z12.31 VISIT FOR SCREENING MAMMOGRAM: ICD-10-CM

## 2020-10-08 PROCEDURE — 77067 SCR MAMMO BI INCL CAD: CPT

## 2020-10-24 ENCOUNTER — HOSPITAL ENCOUNTER (OUTPATIENT)
Dept: LAB | Facility: MEDICAL CENTER | Age: 70
End: 2020-10-24
Attending: FAMILY MEDICINE
Payer: MEDICARE

## 2020-10-24 LAB
25(OH)D3 SERPL-MCNC: 34 NG/ML (ref 30–100)
ALBUMIN SERPL BCP-MCNC: 4.3 G/DL (ref 3.2–4.9)
ALBUMIN/GLOB SERPL: 1.4 G/DL
ALP SERPL-CCNC: 79 U/L (ref 30–99)
ALT SERPL-CCNC: 19 U/L (ref 2–50)
ANION GAP SERPL CALC-SCNC: 8 MMOL/L (ref 7–16)
APPEARANCE UR: CLEAR
AST SERPL-CCNC: 19 U/L (ref 12–45)
BASOPHILS # BLD AUTO: 0.4 % (ref 0–1.8)
BASOPHILS # BLD: 0.03 K/UL (ref 0–0.12)
BILIRUB SERPL-MCNC: 0.9 MG/DL (ref 0.1–1.5)
BILIRUB UR QL STRIP.AUTO: NEGATIVE
BUN SERPL-MCNC: 12 MG/DL (ref 8–22)
CALCIUM SERPL-MCNC: 9 MG/DL (ref 8.5–10.5)
CHLORIDE SERPL-SCNC: 103 MMOL/L (ref 96–112)
CHOLEST SERPL-MCNC: 229 MG/DL (ref 100–199)
CO2 SERPL-SCNC: 27 MMOL/L (ref 20–33)
COLOR UR: YELLOW
CREAT SERPL-MCNC: 0.53 MG/DL (ref 0.5–1.4)
EOSINOPHIL # BLD AUTO: 0 K/UL (ref 0–0.51)
EOSINOPHIL NFR BLD: 0 % (ref 0–6.9)
ERYTHROCYTE [DISTWIDTH] IN BLOOD BY AUTOMATED COUNT: 41.2 FL (ref 35.9–50)
GLOBULIN SER CALC-MCNC: 3.1 G/DL (ref 1.9–3.5)
GLUCOSE SERPL-MCNC: 93 MG/DL (ref 65–99)
GLUCOSE UR STRIP.AUTO-MCNC: NEGATIVE MG/DL
HCT VFR BLD AUTO: 46.9 % (ref 37–47)
HDLC SERPL-MCNC: 49 MG/DL
HGB BLD-MCNC: 14.9 G/DL (ref 12–16)
IMM GRANULOCYTES # BLD AUTO: 0.04 K/UL (ref 0–0.11)
IMM GRANULOCYTES NFR BLD AUTO: 0.6 % (ref 0–0.9)
KETONES UR STRIP.AUTO-MCNC: NEGATIVE MG/DL
LDLC SERPL CALC-MCNC: 144 MG/DL
LEUKOCYTE ESTERASE UR QL STRIP.AUTO: NEGATIVE
LYMPHOCYTES # BLD AUTO: 2.05 K/UL (ref 1–4.8)
LYMPHOCYTES NFR BLD: 30.2 % (ref 22–41)
MCH RBC QN AUTO: 29.6 PG (ref 27–33)
MCHC RBC AUTO-ENTMCNC: 31.8 G/DL (ref 33.6–35)
MCV RBC AUTO: 93.2 FL (ref 81.4–97.8)
MICRO URNS: NORMAL
MONOCYTES # BLD AUTO: 0.5 K/UL (ref 0–0.85)
MONOCYTES NFR BLD AUTO: 7.4 % (ref 0–13.4)
NEUTROPHILS # BLD AUTO: 4.17 K/UL (ref 2–7.15)
NEUTROPHILS NFR BLD: 61.4 % (ref 44–72)
NITRITE UR QL STRIP.AUTO: NEGATIVE
NRBC # BLD AUTO: 0 K/UL
NRBC BLD-RTO: 0 /100 WBC
PH UR STRIP.AUTO: 7.5 [PH] (ref 5–8)
PLATELET # BLD AUTO: 279 K/UL (ref 164–446)
PMV BLD AUTO: 9.8 FL (ref 9–12.9)
POTASSIUM SERPL-SCNC: 3.8 MMOL/L (ref 3.6–5.5)
PROT SERPL-MCNC: 7.4 G/DL (ref 6–8.2)
PROT UR QL STRIP: NEGATIVE MG/DL
RBC # BLD AUTO: 5.03 M/UL (ref 4.2–5.4)
RBC UR QL AUTO: NEGATIVE
SODIUM SERPL-SCNC: 138 MMOL/L (ref 135–145)
SP GR UR STRIP.AUTO: 1.02
TRIGL SERPL-MCNC: 179 MG/DL (ref 0–149)
TSH SERPL DL<=0.005 MIU/L-ACNC: 3.42 UIU/ML (ref 0.38–5.33)
UROBILINOGEN UR STRIP.AUTO-MCNC: 0.2 MG/DL
WBC # BLD AUTO: 6.8 K/UL (ref 4.8–10.8)

## 2020-10-24 PROCEDURE — 81003 URINALYSIS AUTO W/O SCOPE: CPT

## 2020-10-24 PROCEDURE — 80053 COMPREHEN METABOLIC PANEL: CPT

## 2020-10-24 PROCEDURE — 85025 COMPLETE CBC W/AUTO DIFF WBC: CPT

## 2020-10-24 PROCEDURE — 80061 LIPID PANEL: CPT

## 2020-10-24 PROCEDURE — 36415 COLL VENOUS BLD VENIPUNCTURE: CPT

## 2020-10-24 PROCEDURE — 84443 ASSAY THYROID STIM HORMONE: CPT

## 2020-10-24 PROCEDURE — 82306 VITAMIN D 25 HYDROXY: CPT

## 2020-10-31 ENCOUNTER — HOSPITAL ENCOUNTER (OUTPATIENT)
Facility: MEDICAL CENTER | Age: 70
End: 2020-10-31
Attending: FAMILY MEDICINE
Payer: MEDICARE

## 2020-10-31 PROCEDURE — 87338 HPYLORI STOOL AG IA: CPT

## 2020-11-02 LAB — H PYLORI AG STL QL IA: NOT DETECTED

## 2020-11-15 ENCOUNTER — HOSPITAL ENCOUNTER (EMERGENCY)
Facility: MEDICAL CENTER | Age: 70
End: 2020-11-15
Attending: EMERGENCY MEDICINE
Payer: MEDICARE

## 2020-11-15 ENCOUNTER — APPOINTMENT (OUTPATIENT)
Dept: RADIOLOGY | Facility: MEDICAL CENTER | Age: 70
End: 2020-11-15
Attending: EMERGENCY MEDICINE
Payer: MEDICARE

## 2020-11-15 VITALS
SYSTOLIC BLOOD PRESSURE: 177 MMHG | WEIGHT: 210 LBS | HEIGHT: 65 IN | RESPIRATION RATE: 16 BRPM | BODY MASS INDEX: 34.99 KG/M2 | OXYGEN SATURATION: 95 % | TEMPERATURE: 98.2 F | DIASTOLIC BLOOD PRESSURE: 81 MMHG | HEART RATE: 72 BPM

## 2020-11-15 DIAGNOSIS — S52.571A OTHER CLOSED INTRA-ARTICULAR FRACTURE OF DISTAL END OF RIGHT RADIUS, INITIAL ENCOUNTER: ICD-10-CM

## 2020-11-15 DIAGNOSIS — I10 HYPERTENSION, UNSPECIFIED TYPE: ICD-10-CM

## 2020-11-15 PROCEDURE — 73030 X-RAY EXAM OF SHOULDER: CPT | Mod: RT

## 2020-11-15 PROCEDURE — 700102 HCHG RX REV CODE 250 W/ 637 OVERRIDE(OP): Performed by: EMERGENCY MEDICINE

## 2020-11-15 PROCEDURE — 25605 CLTX DST RDL FX/EPHYS SEP W/: CPT

## 2020-11-15 PROCEDURE — 29125 APPL SHORT ARM SPLINT STATIC: CPT

## 2020-11-15 PROCEDURE — 700111 HCHG RX REV CODE 636 W/ 250 OVERRIDE (IP): Performed by: EMERGENCY MEDICINE

## 2020-11-15 PROCEDURE — 96376 TX/PRO/DX INJ SAME DRUG ADON: CPT

## 2020-11-15 PROCEDURE — 302875 HCHG BANDAGE ACE 4 OR 6""

## 2020-11-15 PROCEDURE — 96374 THER/PROPH/DIAG INJ IV PUSH: CPT

## 2020-11-15 PROCEDURE — 700101 HCHG RX REV CODE 250: Performed by: EMERGENCY MEDICINE

## 2020-11-15 PROCEDURE — 96375 TX/PRO/DX INJ NEW DRUG ADDON: CPT

## 2020-11-15 PROCEDURE — A9270 NON-COVERED ITEM OR SERVICE: HCPCS | Performed by: EMERGENCY MEDICINE

## 2020-11-15 PROCEDURE — 99284 EMERGENCY DEPT VISIT MOD MDM: CPT

## 2020-11-15 PROCEDURE — 73100 X-RAY EXAM OF WRIST: CPT | Mod: RT

## 2020-11-15 RX ORDER — MORPHINE SULFATE 4 MG/ML
4 INJECTION, SOLUTION INTRAMUSCULAR; INTRAVENOUS ONCE
Status: COMPLETED | OUTPATIENT
Start: 2020-11-15 | End: 2020-11-15

## 2020-11-15 RX ORDER — ONDANSETRON 2 MG/ML
4 INJECTION INTRAMUSCULAR; INTRAVENOUS ONCE
Status: COMPLETED | OUTPATIENT
Start: 2020-11-15 | End: 2020-11-15

## 2020-11-15 RX ORDER — OXYCODONE AND ACETAMINOPHEN 10; 325 MG/1; MG/1
1 TABLET ORAL ONCE
Status: COMPLETED | OUTPATIENT
Start: 2020-11-15 | End: 2020-11-15

## 2020-11-15 RX ORDER — LIDOCAINE HYDROCHLORIDE 20 MG/ML
20 INJECTION, SOLUTION INFILTRATION; PERINEURAL ONCE
Status: COMPLETED | OUTPATIENT
Start: 2020-11-15 | End: 2020-11-15

## 2020-11-15 RX ORDER — HYDROCODONE BITARTRATE AND ACETAMINOPHEN 5; 325 MG/1; MG/1
1-2 TABLET ORAL EVERY 6 HOURS PRN
Qty: 40 TAB | Refills: 0 | Status: SHIPPED | OUTPATIENT
Start: 2020-11-15 | End: 2020-11-21

## 2020-11-15 RX ADMIN — OXYCODONE HYDROCHLORIDE AND ACETAMINOPHEN 1 TABLET: 10; 325 TABLET ORAL at 19:04

## 2020-11-15 RX ADMIN — MORPHINE SULFATE 4 MG: 4 INJECTION INTRAVENOUS at 15:39

## 2020-11-15 RX ADMIN — ONDANSETRON 4 MG: 2 INJECTION INTRAMUSCULAR; INTRAVENOUS at 15:39

## 2020-11-15 RX ADMIN — LIDOCAINE HYDROCHLORIDE 20 ML: 20 INJECTION, SOLUTION INFILTRATION; PERINEURAL at 17:45

## 2020-11-15 RX ADMIN — MORPHINE SULFATE 4 MG: 4 INJECTION INTRAVENOUS at 16:29

## 2020-11-15 ASSESSMENT — PAIN DESCRIPTION - PAIN TYPE: TYPE: ACUTE PAIN

## 2020-11-15 ASSESSMENT — FIBROSIS 4 INDEX: FIB4 SCORE: 1.08

## 2020-11-15 NOTE — Clinical Note
Cate Niño was seen and treated in our emergency department on 11/15/2020.  She may return to work on 11/24/2020.       If you have any questions or concerns, please don't hesitate to call.      Yoni Gonzalez M.D.

## 2020-11-15 NOTE — ED TRIAGE NOTES
Pt brought in by Ems with c/o fall while raking leaves today. Pt reports right wrist pain and is swollen and bilateral knee pain with abrasions. Pt does not appear to be in any apparent distress. VSS>

## 2020-11-15 NOTE — ED PROVIDER NOTES
ED Provider Note    CHIEF COMPLAINT  Chief Complaint   Patient presents with   • Fall   • Arm Pain     R   • Wrist Injury     R   • Shoulder Pain     R       HPI  Cate Niño is a 70 y.o. female who presents for evaluation after a fall.  Patient had a mechanical ground-level fall landing on her right outstretched hand.  She is complaining primarily of wrist pain but also is having some shoulder discomfort.  Patient has a history of hypertension which she knows about.  She refuses to take antihypertensive medications.  She is hypertensive on arrival and I discussed that with her and we will elect to treat her hypertension with analgesics initially.  She denies any numbness or weakness.  She did not hit her head or have a loss of consciousness.  She has been ambulatory.  She has no other complaints other than her right upper extremity.    REVIEW OF SYSTEMS  See HPI for further details. All other systems negative.    PAST MEDICAL HISTORY  No past medical history on file.    FAMILY HISTORY  No family history on file.    SOCIAL HISTORY  Social History     Socioeconomic History   • Marital status:      Spouse name: Not on file   • Number of children: Not on file   • Years of education: Not on file   • Highest education level: Not on file   Occupational History   • Not on file   Social Needs   • Financial resource strain: Not on file   • Food insecurity     Worry: Not on file     Inability: Not on file   • Transportation needs     Medical: Not on file     Non-medical: Not on file   Tobacco Use   • Smoking status: Never Smoker   Substance and Sexual Activity   • Alcohol use: No   • Drug use: No   • Sexual activity: Not on file   Lifestyle   • Physical activity     Days per week: Not on file     Minutes per session: Not on file   • Stress: Not on file   Relationships   • Social connections     Talks on phone: Not on file     Gets together: Not on file     Attends Zoroastrianism service: Not on file     Active  "member of club or organization: Not on file     Attends meetings of clubs or organizations: Not on file     Relationship status: Not on file   • Intimate partner violence     Fear of current or ex partner: Not on file     Emotionally abused: Not on file     Physically abused: Not on file     Forced sexual activity: Not on file   Other Topics Concern   • Not on file   Social History Narrative   • Not on file       SURGICAL HISTORY  No past surgical history on file.    CURRENT MEDICATIONS  Home Medications    **Home medications have not yet been reviewed for this encounter**         ALLERGIES  No Known Allergies    PHYSICAL EXAM  VITAL SIGNS: BP (!) 232/107   Pulse 77   Temp 36.8 °C (98.2 °F) (Temporal)   Resp 16   Ht 1.651 m (5' 5\")   Wt 95.3 kg (210 lb)   SpO2 92%   BMI 34.95 kg/m²   Constitutional: Well developed, Well nourished, No acute distress, Non-toxic appearance.   HENT: Normocephalic, Atraumatic.  Eyes:  EOMI, Conjunctiva normal, No discharge.   Cardiovascular: Normal heart rate.   Thorax & Lungs: No respiratory distress.  No chest tenderness.   Abdomen: Soft and nontender.   Skin: Warm, Dry.  Musculoskeletal: Right shoulder shows no deformity.  She has some minimal tenderness and slight discomfort with range of motion.  Elbow is nontender with good range of motion.  Wrist shows some swelling to the radial aspect of the wrist which is tender to palpation.  She has a 2+ radial pulse.  Sensation is intact to the median, radial, and ulnar nerve distribution.  Motor is intact to the hand.  There are no breaks in the skin.  Neurologic: Awake and alert, No focal deficits noted.            RADIOLOGY/PROCEDURES  DX-SHOULDER 2+ RIGHT   Final Result      No acute right shoulder fracture or dislocation.      DX-WRIST-LIMITED 2- RIGHT   Final Result      1.  Acute transverse intra-articular fracture of the distal right radial metaphysis with slight posterior displacement.      2.  No distal ulnar or carpal " fracture identified.        Procedure note  Indication: Closed right distal radius fracture  Lidocaine 2% is used for a hematoma block.  Pressure and traction is used to reduce the dorsally displaced fragment.  Patient is placed in a sugar tong splint and sling.  Post procedure evaluation shows her to be neurovascularly intact.    COURSE & MEDICAL DECISION MAKING  Pertinent Labs & Imaging studies reviewed. (See chart for details)  This is a 70-year-old here for evaluation after a mechanical ground-level fall.  Her main injury is to her right wrist.  She is also having some slight right shoulder discomfort.  On exam she appears to have some swelling and tenderness to the distal radius region.  The hand is neurovascularly intact.  X-rays of the shoulder show no fracture or dislocation.  X-rays of the right wrist shows an acute transverse intra-articular fracture of the distal right radial metaphysis with slight posterior displacement.  I discussed the results of the x-rays with the patient.  I have also spoken with Dr. Wall who is on-call for orthopedics.  He has requested that a use a hematoma block and do subtle reduction and splinting and the patient will be seen in follow-up in the office.  The fracture is reduced per the procedure note above.  She is given contact information for Dr. Wall.  She is provided a work note.  She will contact Dr. Wall's office tomorrow for follow-up this week.  I have explained to her that she still may require surgical intervention.  She is provided a prescription for [place].  I reviewed her prescription monitoring report and she will sign a consent for treatment with narcotics.  The patient knows that she has hypertension and refuses to take any medication for treatment.  She will be given a discharge instruction sheet on hypertension.  She is also given a discharge instruction sheet on wrist fractures.    FINAL IMPRESSION  1.  Mechanical ground-level fall  2.   Intra-articular right distal radius fracture closed  3.  Closed reduction of distal radius fracture using hematoma block  4.  Essential hypertension untreated         Electronically signed by: Yoni Gonzalez M.D., 11/15/2020 3:34 PM

## 2020-11-16 NOTE — ED NOTES
Pt reassessed with no unmet needs. Pt given discharge instructions and verbalized understanding via teach back method. Pt calling for cab. Pt has discharge instructions and prescription in hand. Pt is asking if ERP can send in prescription to pharmacy. This RN to inquire about request before pt leaves.

## 2020-11-19 ENCOUNTER — PATIENT OUTREACH (OUTPATIENT)
Dept: HEALTH INFORMATION MANAGEMENT | Facility: OTHER | Age: 70
End: 2020-11-19

## 2020-11-25 ENCOUNTER — HOSPITAL ENCOUNTER (OUTPATIENT)
Dept: RADIOLOGY | Facility: MEDICAL CENTER | Age: 70
End: 2020-11-25
Attending: ORTHOPAEDIC SURGERY
Payer: MEDICARE

## 2020-11-25 DIAGNOSIS — S52.181A OTHER FRACTURE OF UPPER END OF RIGHT RADIUS, INITIAL ENCOUNTER FOR CLOSED FRACTURE: ICD-10-CM

## 2020-11-25 PROCEDURE — 73200 CT UPPER EXTREMITY W/O DYE: CPT | Mod: RT

## 2020-12-18 ENCOUNTER — HOSPITAL ENCOUNTER (EMERGENCY)
Facility: MEDICAL CENTER | Age: 70
End: 2020-12-18
Attending: EMERGENCY MEDICINE
Payer: MEDICARE

## 2020-12-18 ENCOUNTER — APPOINTMENT (OUTPATIENT)
Dept: RADIOLOGY | Facility: MEDICAL CENTER | Age: 70
End: 2020-12-18
Attending: EMERGENCY MEDICINE
Payer: MEDICARE

## 2020-12-18 VITALS
HEART RATE: 90 BPM | OXYGEN SATURATION: 96 % | BODY MASS INDEX: 33.32 KG/M2 | HEIGHT: 65 IN | TEMPERATURE: 97.9 F | DIASTOLIC BLOOD PRESSURE: 115 MMHG | RESPIRATION RATE: 16 BRPM | WEIGHT: 200 LBS | SYSTOLIC BLOOD PRESSURE: 173 MMHG

## 2020-12-18 DIAGNOSIS — I82.432 ACUTE DEEP VEIN THROMBOSIS (DVT) OF POPLITEAL VEIN OF LEFT LOWER EXTREMITY (HCC): ICD-10-CM

## 2020-12-18 LAB
ALBUMIN SERPL BCP-MCNC: 3.8 G/DL (ref 3.2–4.9)
ALBUMIN/GLOB SERPL: 1 G/DL
ALP SERPL-CCNC: 87 U/L (ref 30–99)
ALT SERPL-CCNC: 24 U/L (ref 2–50)
ANION GAP SERPL CALC-SCNC: 11 MMOL/L (ref 7–16)
AST SERPL-CCNC: 26 U/L (ref 12–45)
BASOPHILS # BLD AUTO: 0.3 % (ref 0–1.8)
BASOPHILS # BLD: 0.03 K/UL (ref 0–0.12)
BILIRUB SERPL-MCNC: 0.7 MG/DL (ref 0.1–1.5)
BUN SERPL-MCNC: 8 MG/DL (ref 8–22)
CALCIUM SERPL-MCNC: 9.3 MG/DL (ref 8.5–10.5)
CHLORIDE SERPL-SCNC: 102 MMOL/L (ref 96–112)
CO2 SERPL-SCNC: 27 MMOL/L (ref 20–33)
CREAT SERPL-MCNC: 0.6 MG/DL (ref 0.5–1.4)
EOSINOPHIL # BLD AUTO: 0 K/UL (ref 0–0.51)
EOSINOPHIL NFR BLD: 0 % (ref 0–6.9)
ERYTHROCYTE [DISTWIDTH] IN BLOOD BY AUTOMATED COUNT: 41.2 FL (ref 35.9–50)
GLOBULIN SER CALC-MCNC: 3.7 G/DL (ref 1.9–3.5)
GLUCOSE SERPL-MCNC: 89 MG/DL (ref 65–99)
HCT VFR BLD AUTO: 45 % (ref 37–47)
HGB BLD-MCNC: 14.1 G/DL (ref 12–16)
IMM GRANULOCYTES # BLD AUTO: 0.04 K/UL (ref 0–0.11)
IMM GRANULOCYTES NFR BLD AUTO: 0.4 % (ref 0–0.9)
LYMPHOCYTES # BLD AUTO: 2.59 K/UL (ref 1–4.8)
LYMPHOCYTES NFR BLD: 27.4 % (ref 22–41)
MCH RBC QN AUTO: 29.6 PG (ref 27–33)
MCHC RBC AUTO-ENTMCNC: 31.3 G/DL (ref 33.6–35)
MCV RBC AUTO: 94.5 FL (ref 81.4–97.8)
MONOCYTES # BLD AUTO: 0.57 K/UL (ref 0–0.85)
MONOCYTES NFR BLD AUTO: 6 % (ref 0–13.4)
NEUTROPHILS # BLD AUTO: 6.21 K/UL (ref 2–7.15)
NEUTROPHILS NFR BLD: 65.9 % (ref 44–72)
NRBC # BLD AUTO: 0 K/UL
NRBC BLD-RTO: 0 /100 WBC
PLATELET # BLD AUTO: 363 K/UL (ref 164–446)
PMV BLD AUTO: 8.9 FL (ref 9–12.9)
POTASSIUM SERPL-SCNC: 3.3 MMOL/L (ref 3.6–5.5)
PROT SERPL-MCNC: 7.5 G/DL (ref 6–8.2)
RBC # BLD AUTO: 4.76 M/UL (ref 4.2–5.4)
SODIUM SERPL-SCNC: 140 MMOL/L (ref 135–145)
WBC # BLD AUTO: 9.4 K/UL (ref 4.8–10.8)

## 2020-12-18 PROCEDURE — 85613 RUSSELL VIPER VENOM DILUTED: CPT

## 2020-12-18 PROCEDURE — 99284 EMERGENCY DEPT VISIT MOD MDM: CPT

## 2020-12-18 PROCEDURE — 93971 EXTREMITY STUDY: CPT | Mod: 26,LT | Performed by: INTERNAL MEDICINE

## 2020-12-18 PROCEDURE — 85520 HEPARIN ASSAY: CPT

## 2020-12-18 PROCEDURE — 85730 THROMBOPLASTIN TIME PARTIAL: CPT

## 2020-12-18 PROCEDURE — 86147 CARDIOLIPIN ANTIBODY EA IG: CPT

## 2020-12-18 PROCEDURE — 85610 PROTHROMBIN TIME: CPT

## 2020-12-18 PROCEDURE — 85732 THROMBOPLASTIN TIME PARTIAL: CPT

## 2020-12-18 PROCEDURE — A9270 NON-COVERED ITEM OR SERVICE: HCPCS | Performed by: EMERGENCY MEDICINE

## 2020-12-18 PROCEDURE — 93971 EXTREMITY STUDY: CPT | Mod: LT

## 2020-12-18 PROCEDURE — 81241 F5 GENE: CPT

## 2020-12-18 PROCEDURE — 85025 COMPLETE CBC W/AUTO DIFF WBC: CPT

## 2020-12-18 PROCEDURE — 85670 THROMBIN TIME PLASMA: CPT

## 2020-12-18 PROCEDURE — 81240 F2 GENE: CPT

## 2020-12-18 PROCEDURE — 85306 CLOT INHIBIT PROT S FREE: CPT

## 2020-12-18 PROCEDURE — 700102 HCHG RX REV CODE 250 W/ 637 OVERRIDE(OP): Performed by: EMERGENCY MEDICINE

## 2020-12-18 PROCEDURE — 80053 COMPREHEN METABOLIC PANEL: CPT

## 2020-12-18 PROCEDURE — 85303 CLOT INHIBIT PROT C ACTIVITY: CPT

## 2020-12-18 RX ORDER — AMOXICILLIN 500 MG/1
500 CAPSULE ORAL ONCE
Status: COMPLETED | OUTPATIENT
Start: 2020-12-18 | End: 2020-12-18

## 2020-12-18 RX ORDER — AMOXICILLIN 500 MG/1
500 CAPSULE ORAL 3 TIMES DAILY
Qty: 15 CAP | Refills: 0 | Status: SHIPPED | OUTPATIENT
Start: 2020-12-18 | End: 2020-12-23

## 2020-12-18 RX ADMIN — RIVAROXABAN 15 MG: 15 TABLET, FILM COATED ORAL at 14:38

## 2020-12-18 RX ADMIN — AMOXICILLIN 500 MG: 500 CAPSULE ORAL at 13:39

## 2020-12-18 ASSESSMENT — FIBROSIS 4 INDEX: FIB4 SCORE: 1.08

## 2020-12-18 NOTE — ED PROVIDER NOTES
"ED Provider Note    Scribed for Irving Moreno M.D. by Bruno Rader. 12/18/2020  12:17 PM    Primary care provider: Beth Muse M.D.  Means of arrival: Walk-in  History obtained from: Patient  History limited by: None    CHIEF COMPLAINT  Chief Complaint   Patient presents with   • Leg Pain       HPI  Cate Niño is a 70 y.o. female who presents to the Emergency Department brought in by EMS to the [facility] for acute, worsening left lower leg pain with redness onset 5 days ago. Patient states that at around last Sunday her leg started swelling with redness and pain developing shortly after. She denies any recent injuries or fevers. Patient reports being previously \"bitten by a brown recluse spider\" to the same leg in the past. She tested negative for COVID last Sunday after previously being infected.     REVIEW OF SYSTEMS  Pertinent positives include redness, pain, and swelling to her left lower leg. Pertinent negatives include no recent injuries or fever.  All other systems reviewed and negative.    PAST MEDICAL HISTORY       SURGICAL HISTORY  patient denies any surgical history    SOCIAL HISTORY  Social History     Tobacco Use   • Smoking status: Never Smoker   Substance Use Topics   • Alcohol use: No     Frequency: Monthly or less     Drinks per session: 1 or 2     Binge frequency: Never   • Drug use: No      Social History     Substance and Sexual Activity   Drug Use No       FAMILY HISTORY  History reviewed. No pertinent family history.    CURRENT MEDICATIONS  Current Outpatient Medications   Medication Instructions   • azithromycin (ZITHROMAX) 250 MG TABS Take 2 tabs on day 1 then 1 tab on day 2-5.   • azithromycin (ZITHROMAX) 250 MG TABS Take 1 tab per day for 4 days   • benzonatate (TESSALON) 100 mg, Oral, 2 TIMES DAILY PRN   • fluticasone (FLOVENT HFA) 110 MCG/ACT AERO 2 Puffs, 2 TIMES DAILY   • ipratropium-albuterol (DUONEB) 0.5-2.5 (3) MG/3ML nebulizer solution 3 mL, 4 TIMES DAILY   • " "lansoprazole (PREVACID) 30 mg, DAILY         ALLERGIES  No Known Allergies    PHYSICAL EXAM  VITAL SIGNS: /100   Pulse 94   Temp 36.6 °C (97.9 °F) (Temporal)   Resp 16   Ht 1.651 m (5' 5\")   Wt 90.7 kg (200 lb)   SpO2 94%   BMI 33.28 kg/m²     Constitutional: Well developed, Well nourished, no acute distress, Non-toxic appearance.   HENT: Normocephalic, Atraumatic, Bilateral external ears normal, Oropharynx moist, No oral exudates.   Eyes: PERRLA, EOMI, Conjunctiva normal, No discharge.   Neck: No tenderness, Supple, No stridor.   Lymphatic: No lymphadenopathy noted.   Cardiovascular: Normal heart rate, Normal rhythm.   Thorax & Lungs: Clear to auscultation bilaterally, No respiratory distress, No wheezing, No crackles.   Abdomen: Soft, No tenderness, No masses, No pulsatile masses.   Skin: Warm, Dry, No erythema, No rash.   Extremities: Medial aspect of the distal tib/fib with erythema, induration, and edema. No cyanosis.   Musculoskeletal: No tenderness to palpation or major deformities noted.  Intact distal pulses  Neurologic: Awake, alert. Moves all extremities spontaneously.  Psychiatric: Affect normal, Judgment normal, Mood normal.     LABS  Results for orders placed or performed during the hospital encounter of 12/18/20   CBC WITH DIFFERENTIAL   Result Value Ref Range    WBC 9.4 4.8 - 10.8 K/uL    RBC 4.76 4.20 - 5.40 M/uL    Hemoglobin 14.1 12.0 - 16.0 g/dL    Hematocrit 45.0 37.0 - 47.0 %    MCV 94.5 81.4 - 97.8 fL    MCH 29.6 27.0 - 33.0 pg    MCHC 31.3 (L) 33.6 - 35.0 g/dL    RDW 41.2 35.9 - 50.0 fL    Platelet Count 363 164 - 446 K/uL    MPV 8.9 (L) 9.0 - 12.9 fL    Neutrophils-Polys 65.90 44.00 - 72.00 %    Lymphocytes 27.40 22.00 - 41.00 %    Monocytes 6.00 0.00 - 13.40 %    Eosinophils 0.00 0.00 - 6.90 %    Basophils 0.30 0.00 - 1.80 %    Immature Granulocytes 0.40 0.00 - 0.90 %    Nucleated RBC 0.00 /100 WBC    Neutrophils (Absolute) 6.21 2.00 - 7.15 K/uL    Lymphs (Absolute) 2.59 1.00 - " 4.80 K/uL    Monos (Absolute) 0.57 0.00 - 0.85 K/uL    Eos (Absolute) 0.00 0.00 - 0.51 K/uL    Baso (Absolute) 0.03 0.00 - 0.12 K/uL    Immature Granulocytes (abs) 0.04 0.00 - 0.11 K/uL    NRBC (Absolute) 0.00 K/uL   COMP METABOLIC PANEL   Result Value Ref Range    Sodium 140 135 - 145 mmol/L    Potassium 3.3 (L) 3.6 - 5.5 mmol/L    Chloride 102 96 - 112 mmol/L    Co2 27 20 - 33 mmol/L    Anion Gap 11.0 7.0 - 16.0    Glucose 89 65 - 99 mg/dL    Bun 8 8 - 22 mg/dL    Creatinine 0.60 0.50 - 1.40 mg/dL    Calcium 9.3 8.5 - 10.5 mg/dL    AST(SGOT) 26 12 - 45 U/L    ALT(SGPT) 24 2 - 50 U/L    Alkaline Phosphatase 87 30 - 99 U/L    Total Bilirubin 0.7 0.1 - 1.5 mg/dL    Albumin 3.8 3.2 - 4.9 g/dL    Total Protein 7.5 6.0 - 8.2 g/dL    Globulin 3.7 (H) 1.9 - 3.5 g/dL    A-G Ratio 1.0 g/dL   ESTIMATED GFR   Result Value Ref Range    GFR If African American >60 >60 mL/min/1.73 m 2    GFR If Non African American >60 >60 mL/min/1.73 m 2   All labs reviewed by me.      RADIOLOGY  US-EXTREMITY VENOUS LOWER UNILAT LEFT   Final Result        The radiologist's interpretation of all radiological studies have been reviewed by me.      COURSE & MEDICAL DECISION MAKING  Pertinent Labs & Imaging studies reviewed. (See chart for details)        12:17 PM - Patient seen and examined at bedside. Patient treated with Amoxil 500 mg. Ordered US-extremity venous lower unital left to evaluate her symptoms.     1:47 PM - US indicated acute venous thrombosis to left popliteal vein. Patient updated on findings and treated with Xarelto 15 mg. Ordered for factor V leiden, lupus anticoagulant, antithrombin III FUNC/IMMUNOL, prothrombin [id number], CBC w/ diff, CMP, protein C functional, and Protein S functional.     Decision Making:  Patient with left leg redness pain swelling, ultrasound shows a DVT, the patient also has questionable cellulitis therefore we will treat the patient with amoxicillin, I started the patient on Xarelto, she will follow-up  with the anticoagulation clinic, have the patient return with worsening symptoms.    The patient will return for new or worsening symptoms and is stable at the time of discharge.    The patient is referred to a primary physician for blood pressure management, diabetic screening, and for all other preventative health concerns.    DISPOSITION:  Patient will be discharged home in stable condition.    FOLLOW UP:  [facility], Emergency Dept  [address]  [place] [id number] [phone]    If symptoms worsen      OUTPATIENT MEDICATIONS:  Discharge Medication List as of 12/18/2020  3:53 PM      START taking these medications    Details   amoxicillin (AMOXIL) 500 MG Cap Take 1 Cap by mouth 3 times a day for 5 days., Disp-15 Cap, R-0, Print Rx Paper      rivaroxaban (XARELTO) 15 MG Tab tablet Take 1 Tab by mouth 2 Times a Day for 21 days., Disp-42 Tab, R-0, Print Rx Paper             FINAL IMPRESSION  1. Acute deep vein thrombosis (DVT) of popliteal vein of left lower extremity (HCC)          IBruno (Sebastien), am scribing for, and in the presence of, Irving Moreno M.D..    Electronically signed by: Bruno Rader (Sebastien), 12/18/2020    IIrving M.D. personally performed the services described in this documentation, as scribed by Bruno Rader in my presence, and it is both accurate and complete. C.    The note accurately reflects work and decisions made by me.  Irving Moreno M.D.  12/18/2020  5:04 PM

## 2020-12-18 NOTE — ED TRIAGE NOTES
71 y/o female ambulatory to triage with EMS with c/o left lower leg pain, redness and swelling. Pt states her symptoms began on Sunday.

## 2020-12-19 LAB
APTT 1H NP PPP: 34 SEC (ref 24.7–36)
APTT 1H P INC POOL PPP: 29.1 SEC (ref 24.7–36)
APTT 1H P INC PPP: 41.4 SEC (ref 24.7–36)
APTT IMM NP PPP: 34.3 SEC (ref 24.7–36)
APTT POOL PPP: 30.1 SEC (ref 24.7–36)
APTT PPP: 41.2 SEC (ref 24.7–36)
APTT PPP: 41.2 SEC (ref 24.7–36)
DRVVT MIX 37 DRVMX37: 44.6 SEC (ref 28–48)
DRVVT MIX IMMEDIATE DRVMXI: 47.2 SEC (ref 28–48)
INR PPP: 1.13 (ref 0.87–1.13)
LA PPP-IMP: ABNORMAL
LA PPP-IMP: NORMAL
PROTHROMBIN TIME: 14.9 SEC (ref 12–14.6)
SCREEN DRVVT: 56.3 SEC (ref 28–48)
THROMBIN TIME: 18.5 SEC
UFH PPP CHRO-ACNC: <0.1 U/ML

## 2020-12-19 NOTE — ED NOTES
Patient understands discharge instructions. Given all DC education, prescriptions, f/u appointments. Pt verbalized understanding.  In no distress. IV and telemetry dc'd. Has all belongings.  Ambulating well with steady gait. Will return for worsening symptoms.    Went over blood thinning medications in detail, and info for the anticoagulation clinic. Pt arranged for a senior + uber ride, and was taken out to triage, no further questions, ambulating well with bag and backpack.

## 2020-12-19 NOTE — DISCHARGE PLANNING
note:  Pt called because the [facility] does not have Xarelto until Monday or Tuesday.     [facility] as per Alice pt will have a copay because they do not accept  prescription insurance. Alice unable to tell what copay is until she is able to transfer the prescription from [facility] to [facility].     ISAIAH called Dea at the [facility] and she will call the other [facility] pharmacies to find out which one has Xarelto,

## 2020-12-19 NOTE — DISCHARGE PLANNING
note:  Pt called in because her pharmacy does not deliver medications and then she said that prescriptions are not at the pharmacy.     CM called [facility] and they confirmed that this prescriptions have not been received.

## 2020-12-19 NOTE — DISCHARGE PLANNING
note:  Dr. Copeland was kind enough to escribe prescriptions to [facility] on [address]. Pt notified.

## 2020-12-21 ENCOUNTER — TELEPHONE (OUTPATIENT)
Dept: VASCULAR LAB | Facility: MEDICAL CENTER | Age: 70
End: 2020-12-21

## 2020-12-21 LAB
CARDIOLIPIN IGG SER IA-ACNC: 7 GPL (ref 0–14)
CARDIOLIPIN IGM SER IA-ACNC: 0 MPL (ref 0–12)
PROT C ACT/NOR PPP: 138 % (ref 83–168)
PROT S ACT/NOR PPP: 101 % (ref 57–131)

## 2020-12-21 NOTE — TELEPHONE ENCOUNTER
Received anticoagulation referral for Xarelto due to acute DVT from Dr. Moreno on 12/18/2020    Pt's wrist is broken and can't drive. Pt's  can't drive either. She has limited transportation options. Will set up [employer] for pt pickup @ 9:00AM.    NP scheduled for 12/29/2020 @ 9:30AM.    Insurance: [facility]  PCP: Non [facility]  Locations to be seen: [facility] at [phone], fax [phone]    Ana Decker, KolbyD

## 2020-12-24 LAB
F2 C.20210G>A GENO BLD/T: NEGATIVE
F5 P.R506Q BLD/T QL: NEGATIVE

## 2020-12-29 ENCOUNTER — TELEPHONE (OUTPATIENT)
Dept: VASCULAR LAB | Facility: MEDICAL CENTER | Age: 70
End: 2020-12-29

## 2020-12-29 ENCOUNTER — ANTICOAGULATION VISIT (OUTPATIENT)
Dept: VASCULAR LAB | Facility: MEDICAL CENTER | Age: 70
End: 2020-12-29
Attending: INTERNAL MEDICINE
Payer: MEDICARE

## 2020-12-29 DIAGNOSIS — I82.401 DEEP VEIN THROMBOSIS (DVT) OF RIGHT LOWER EXTREMITY, UNSPECIFIED CHRONICITY, UNSPECIFIED VEIN (HCC): ICD-10-CM

## 2020-12-29 PROBLEM — I82.409 DEEP VEIN THROMBOSIS (HCC): Status: ACTIVE | Noted: 2020-12-29

## 2020-12-29 LAB — INR PPP: 1.6 (ref 2–3.5)

## 2020-12-29 PROCEDURE — 99213 OFFICE O/P EST LOW 20 MIN: CPT

## 2020-12-29 PROCEDURE — 85610 PROTHROMBIN TIME: CPT

## 2020-12-29 NOTE — PROGRESS NOTES
Anticoagulation Summary  As of 2020    INR goal:     TTR:  --   INR used for dosin.60 (2020)   Warfarin maintenance plan:  No maintenance plan   Next INR check:  2021   Target end date:      Indications    Deep vein thrombosis (HCC) [I82.409]             Anticoagulation Episode Summary     INR check location:  Anticoagulation Clinic    Preferred lab:      Send INR reminders to:      Comments:  Primary care provider is a nonrenown provider-fax note.  Fax: [phone]  Xarelto.      Anticoagulation Care Providers     Provider Role Specialty Phone number    [facility]   [phone]    Beth Muse M.D.  Family Medicine [phone]        Anticoagulation Patient Findings      HPI:     • Cate Niño was referred to the [facility] for anticoagulation management with Xarelto for a new deep vein thrombosis.  She did recently break her right arm in November. She was Covid positive in November, however, tested negative on the  before her emergency room visit.  Between the recent broken arm, decrease in mobility, and Covid infection there appears to be a few provoking events.   In the past, she also was bitten by a brown recluse spider on that leg.    • She also has a follow-up appointment with her primary care provider tomorrow.  Her primary care provider is a nonrenown provider, therefore, we will fax this note to her-per our protocol.    · Ultrasound from 2020:  Left lower extremity -Echolucent material fills the popliteal vein that has the appearance of acute venous thrombosis.     • Does this pt have a past history of using a AC?-- no  • Does this pt have a past history of blood clots? -- no  • Does this pt have a family history of blood clots or known clotting disorders?--no  • Was there are provoking events leading to this blood clot or stroke?--Broken right arm and Covid infection    Care Team      Cardiologist: n/a, she does not have  one    Hematologist: n/a, she does not have one    PCP:  Beth Muse M.D.  [address] [address]      Meds, Vitals, and labs     3 vitals included with today's appt-unless patient declined:  (BP, HR, weight, ht, RR)   There were no vitals filed for this visit.    Lab Results   Component Value Date/Time    ALKPHOSPHAT 87 12/18/2020 02:23 PM    ASTSGOT 26 12/18/2020 02:23 PM    ALTSGPT 24 12/18/2020 02:23 PM    TBILIRUBIN 0.7 12/18/2020 02:23 PM    ALBUMIN 3.8 12/18/2020 02:23 PM    BUN 8 12/18/2020 02:23 PM    CREATININE 0.60 12/18/2020 02:23 PM    RBC 4.76 12/18/2020 02:23 PM    HEMOGLOBIN 14.1 12/18/2020 02:23 PM    HEMATOCRIT 45.0 12/18/2020 02:23 PM    PLATELETCT 363 12/18/2020 02:23 PM    INR 1.60 12/29/2020          Current Outpatient Medications:   •  rivaroxaban, 20 mg, Oral, PM MEAL  •  rivaroxaban, 15 mg, Oral, BID  •  azithromycin, Take 2 tabs on day 1 then 1 tab on day 2-5.  •  benzonatate, 100 mg, Oral, BID PRN  •  azithromycin, Take 1 tab per day for 4 days  •  lansoprazole, 30 mg, Oral, DAILY  •  ipratropium-albuterol, 3 mL, Nebulization, 4XDAY  •  fluticasone, 2 Puff, Inhalation, BID    Assessment:     • INR  sub-therapeutic.   • DOAC affordable = yes  • Interval Changes in ETOH:   No  • Interval Changes in smoking status:  No  • S/S of bleeding or bruising:  No  • Signs/symptoms  thrombosis since the last appt:  No  • Any upcoming procedures that require stopping a anticoagulant: None  • Interval Changes in medication:  No   • Is pt on antiplatelet therapy: NO  • Are there any clinically significant drug interactions?-- no      Current Outpatient Medications:   •  rivaroxaban, 20 mg, Oral, PM MEAL  •  rivaroxaban, 15 mg, Oral, BID  •  azithromycin, Take 2 tabs on day 1 then 1 tab on day 2-5.  •  benzonatate, 100 mg, Oral, BID PRN  •  azithromycin, Take 1 tab per day for 4 days  •  lansoprazole, 30 mg, Oral, DAILY  •  ipratropium-albuterol, 3 mL, Nebulization, 4XDAY  •   fluticasone, 2 Puff, Inhalation, BID      HAS-BLED AND CHADSVAS   Hypertension-Uncontrolled, >160 mmHg systolic- n  Renal disease Dialysis, transplant, Cr >2.26 mg/dL or >200 µmol/L - n  Liver disease Cirrhosis or bilirubin >2x normal with AST/ALT/AP >3x normal- n  Stroke history, prior major bleeding, or predisposition to bleeding- n  Labile INR, Unstable/high INRs, time in therapeutic range <60%- n  Age >65- y  Medication usage predisposing to bleeding Aspirin, clopidogrel, NSAIDs - n  Alcohol use  ?8 drinks/week- n  HAS-BLED = 1    CHADSVAS=n/a    Plan:     Xarelto 15mg taken 2 times a day for 21 days and then change to 20mg taken once daily.  Pt will transition to 20mg dose on 1/8/21       Follow-up:   • Our protocol suggests we test in 3 weeks.   Patient requested her next appointment in 5 weeks due to her inability to drive with her broken right arm.      • Labs to be completed prior to next f/u - CBC, CMP    Additional information discussed with patient:   • Pt educated to contact our clinic with any changes in medications or s/s of bleeding or thrombosis.  • Education was provided today regarding tips to reduce their bleed risk and dietary constraints while on a anticoagulant.  • Pt gave verbal consent  to leave a  with detailed medical information about this topic.   • Pt education done regarding the medication listed above.  · Lifestyle safety, ie smoking, ETOH, hobby safety, fall safety/prevention  · Procedures for missed doses or suspected missed doses, surgeries/procedures, travel, dental work, any medication changes    National recommendations regarding anticoagulation therapy:   The CHEST guidelines recommends frequent INR monitoring at regular intervals (a few days up to a max of 12 weeks) to ensure patients are on the proper dose of warfarin, and patients are not having any complications from therapy.  INRs can dramatically change over a short time period due to diet, medications, and medical  conditions.       Rico Gross, PharmD, MS, BCACP, [facility]  Phone [phone] fax [phone]    This note was created using voice recognition software (Dragon). The accuracy of the dictation is limited by the abilities of the software. I have reviewed the note prior to signing, however some errors in grammar and context are still possible. If you have any questions related to this note please do not hesitate to contact our office.         [facility] Consent                                             Name    Cate Niño    MRN number: [id number]    the following are guidelines for participation in the [facility].     I, ____Cate Niño_____, understand and voluntarily agree to participate in the [facility] and to have services provided to me by pharmacists working in collaboration with my provider.    I understand the pharmacist within the [facility] may initiate, modify or discontinue my medications, order appropriate testing and appointments, perform exams, monitor treatment, and make clinical evaluations and decisions pursuant to a collaborative practice agreement with my provider.  I understand the pharmacist within the [facility] is not a physician, osteopathic physician, advanced practice registered nurse or physician assistant and may not diagnose.  I will take all my medications as instructed and not change the way I take it without first talking to my provider or a pharmacist within the [facility].  I understand that if I am late to my appointment I may not be able to be seen by a pharmacist at that time and will have to reschedule my appointment.  During appointment with pharmacist I understand that pharmacist has the right not to answer questions or perform services outside the  pharmacist’s scope of practice.  By signing below, I provide informed consent for the pharmacist to provide these services and for my participation in the [facility].      Catequinton Camachodavina Niño           [id number]          12/29/20  Patient Name                   MRN number  Date     ____Obtained verbal consent from pt, No signature due to COVID concerns___   Patient Signature

## 2020-12-30 NOTE — TELEPHONE ENCOUNTER
Initial anticoag note and most recent ED note reviewed.  Patient with dvt likely provoked by covid and low risk orthopedic surgery    Will continue with 3 mo of oral anticoag and then check back with PCP and or schedule with vasc med to see if should d/c or perform further w/u.    Will defer any indicated age appropriate screening for occult malignancy to pcp.    Michael Bloch, MD  Anticoagulation Clinic    Cc:  ROSS Muse

## 2021-01-06 ENCOUNTER — HOSPITAL ENCOUNTER (OUTPATIENT)
Dept: RADIOLOGY | Facility: MEDICAL CENTER | Age: 71
End: 2021-01-06
Attending: FAMILY MEDICINE
Payer: MEDICARE

## 2021-01-06 DIAGNOSIS — N95.9 MENOPAUSAL PROBLEM: ICD-10-CM

## 2021-01-06 PROCEDURE — 77080 DXA BONE DENSITY AXIAL: CPT

## 2021-01-16 ENCOUNTER — HOSPITAL ENCOUNTER (OUTPATIENT)
Dept: LAB | Facility: MEDICAL CENTER | Age: 71
End: 2021-01-16
Attending: NURSE PRACTITIONER
Payer: MEDICARE

## 2021-01-16 DIAGNOSIS — I82.401 DEEP VEIN THROMBOSIS (DVT) OF RIGHT LOWER EXTREMITY, UNSPECIFIED CHRONICITY, UNSPECIFIED VEIN (HCC): ICD-10-CM

## 2021-01-16 LAB
ALBUMIN SERPL BCP-MCNC: 3.9 G/DL (ref 3.2–4.9)
ALBUMIN/GLOB SERPL: 1.1 G/DL
ALP SERPL-CCNC: 73 U/L (ref 30–99)
ALT SERPL-CCNC: 14 U/L (ref 2–50)
ANION GAP SERPL CALC-SCNC: 10 MMOL/L (ref 7–16)
AST SERPL-CCNC: 20 U/L (ref 12–45)
BILIRUB SERPL-MCNC: 0.5 MG/DL (ref 0.1–1.5)
BUN SERPL-MCNC: 9 MG/DL (ref 8–22)
CALCIUM SERPL-MCNC: 9.2 MG/DL (ref 8.5–10.5)
CHLORIDE SERPL-SCNC: 105 MMOL/L (ref 96–112)
CO2 SERPL-SCNC: 25 MMOL/L (ref 20–33)
CREAT SERPL-MCNC: 0.56 MG/DL (ref 0.5–1.4)
ERYTHROCYTE [DISTWIDTH] IN BLOOD BY AUTOMATED COUNT: 43.9 FL (ref 35.9–50)
GLOBULIN SER CALC-MCNC: 3.5 G/DL (ref 1.9–3.5)
GLUCOSE SERPL-MCNC: 102 MG/DL (ref 65–99)
HCT VFR BLD AUTO: 46.3 % (ref 37–47)
HGB BLD-MCNC: 14.5 G/DL (ref 12–16)
MCH RBC QN AUTO: 29.5 PG (ref 27–33)
MCHC RBC AUTO-ENTMCNC: 31.3 G/DL (ref 33.6–35)
MCV RBC AUTO: 94.1 FL (ref 81.4–97.8)
PLATELET # BLD AUTO: 275 K/UL (ref 164–446)
PMV BLD AUTO: 9.1 FL (ref 9–12.9)
POTASSIUM SERPL-SCNC: 3.9 MMOL/L (ref 3.6–5.5)
PROT SERPL-MCNC: 7.4 G/DL (ref 6–8.2)
RBC # BLD AUTO: 4.92 M/UL (ref 4.2–5.4)
SODIUM SERPL-SCNC: 140 MMOL/L (ref 135–145)
WBC # BLD AUTO: 6.8 K/UL (ref 4.8–10.8)

## 2021-01-16 PROCEDURE — 80053 COMPREHEN METABOLIC PANEL: CPT

## 2021-01-16 PROCEDURE — 36415 COLL VENOUS BLD VENIPUNCTURE: CPT

## 2021-01-16 PROCEDURE — 85027 COMPLETE CBC AUTOMATED: CPT

## 2021-02-01 ENCOUNTER — APPOINTMENT (OUTPATIENT)
Dept: VASCULAR LAB | Facility: MEDICAL CENTER | Age: 71
End: 2021-02-01
Payer: MEDICARE

## 2021-02-12 ENCOUNTER — ANTICOAGULATION VISIT (OUTPATIENT)
Dept: VASCULAR LAB | Facility: MEDICAL CENTER | Age: 71
End: 2021-02-12
Attending: INTERNAL MEDICINE
Payer: MEDICARE

## 2021-02-12 VITALS — DIASTOLIC BLOOD PRESSURE: 85 MMHG | HEART RATE: 72 BPM | SYSTOLIC BLOOD PRESSURE: 153 MMHG

## 2021-02-12 DIAGNOSIS — Z79.01 CHRONIC ANTICOAGULATION: ICD-10-CM

## 2021-02-12 PROCEDURE — 99211 OFF/OP EST MAY X REQ PHY/QHP: CPT

## 2021-02-12 NOTE — PROGRESS NOTES
Target end date:March 18 2021  Indication: DVT, likely provoked by covid and low risk orthopedic surgery  Drug: Xarelto 20 mg  CHADsVASC = N/A    Health Status Since Last Assessment  Patient denies any new relevant medical problems, ED visits or hospitalizations  Patient denies any embolic events (stroke/tia/systemic embolism)    Adherence with DOAC Therapy  Pt has 0 missed any doses in the average week    Bleeding Risk Assessment    No Epistaxis  Pt denies any excessive or unusual bleeding/hematomas.  Pt denies any GI bleeds or hematemesis.  Pt denies any concerning daily headache or sub dural hematoma symptoms.    Pt denies any hematuria or abnormal vaginal bleeding.  Latest Hemoglobin Results for NICOLE WINTERS (MRN [id number]) as of 2/12/2021 16:07   Ref. Range 1/16/2021 08:28   WBC Latest Ref Range: 4.8 - 10.8 K/uL 6.8   RBC Latest Ref Range: 4.20 - 5.40 M/uL 4.92   Hemoglobin Latest Ref Range: 12.0 - 16.0 g/dL 14.5   Hematocrit Latest Ref Range: 37.0 - 47.0 % 46.3   MCV Latest Ref Range: 81.4 - 97.8 fL 94.1   MCH Latest Ref Range: 27.0 - 33.0 pg 29.5   MCHC Latest Ref Range: 33.6 - 35.0 g/dL 31.3 (L)   RDW Latest Ref Range: 35.9 - 50.0 fL 43.9   Platelet Count Latest Ref Range: 164 - 446 K/uL 275   MPV Latest Ref Range: 9.0 - 12.9 fL 9.1     ETOH overuse None     Creatinine Clearance/Renal Function    Latest ClCr Results for NICOLE WINTERS (MRN [id number]) as of 2/12/2021 16:07   Ref. Range 1/16/2021 08:28   GFR If Non  Latest Ref Range: >60 mL/min/1.73 m 2 >60       Hepatic function  Latest LFTs Results for NICOLE WINTERS (MRN [id number]) as of 2/12/2021 16:07   Ref. Range 1/16/2021 08:28   AST(SGOT) Latest Ref Range: 12 - 45 U/L 20   ALT(SGPT) Latest Ref Range: 2 - 50 U/L 14     Pt denies any history of liver dysfunction      Drug Interactions  ASA/other antiplatelets- none  NSAID- nonetakes Tylenol as needed for pain  Other drug interactions- none  Verified no  anticonvulsant or azole therapy, education provided for future use.     Examination  Blood Pressure 153/72 (not on any BP meds, PCP is aware, she will discuss with them)  Symptomatic hypotension None  Significant gait impairment/imbalance/high risk for falls? She walks with a cane    Final Assessment and Recommendations:  Patient appears stable from the anticoagulation standpoint.    Benefits of continued DOAC therapy outweigh risks for this patient  Recommend pt continue with current Xarelto 20 mg once daily. Per Dr Bloch 3 month duration. Patient will be scheduled for length of therapy with vascular specialist 03/22/2021.    Follow up:  Will follow up with patient 6 weeks.      Beata Azul, Pharm.D

## 2021-03-03 DIAGNOSIS — Z23 NEED FOR VACCINATION: ICD-10-CM

## 2021-03-22 ENCOUNTER — APPOINTMENT (OUTPATIENT)
Dept: VASCULAR LAB | Facility: MEDICAL CENTER | Age: 71
End: 2021-03-22
Payer: MEDICARE

## 2021-03-24 ENCOUNTER — ANTICOAGULATION VISIT (OUTPATIENT)
Dept: VASCULAR LAB | Facility: MEDICAL CENTER | Age: 71
End: 2021-03-24
Attending: INTERNAL MEDICINE
Payer: MEDICARE

## 2021-03-24 VITALS — SYSTOLIC BLOOD PRESSURE: 139 MMHG | DIASTOLIC BLOOD PRESSURE: 78 MMHG | HEART RATE: 87 BPM

## 2021-03-24 DIAGNOSIS — I82.4Y9 DEEP VEIN THROMBOSIS (DVT) OF PROXIMAL LOWER EXTREMITY, UNSPECIFIED CHRONICITY, UNSPECIFIED LATERALITY (HCC): ICD-10-CM

## 2021-03-24 PROCEDURE — 99214 OFFICE O/P EST MOD 30 MIN: CPT | Performed by: NURSE PRACTITIONER

## 2021-03-24 PROCEDURE — 99212 OFFICE O/P EST SF 10 MIN: CPT | Performed by: NURSE PRACTITIONER

## 2021-03-24 ASSESSMENT — ENCOUNTER SYMPTOMS
LOSS OF CONSCIOUSNESS: 0
BRUISES/BLEEDS EASILY: 0
MYALGIAS: 0
FALLS: 0
FEVER: 0
CHILLS: 0
BLOOD IN STOOL: 0
HEMOPTYSIS: 0
SHORTNESS OF BREATH: 0

## 2021-03-24 NOTE — PROGRESS NOTES
"VASCULAR MEDICINE CLINIC - INITIAL VISIT (ANTICOAGULATION)  03/24/21     Cate Niño is a 70 y.o. male who presents today for evaluation of her anticoagulation therapy.    HPI:  Patient referred for evaluation and management of anticoagulation therapy in the setting of DVT.  Had 1st time VTE 12/18/20 - LLE \"Echolucent material fills the popliteal vein that has the appearance of acute venous thrombosis.\"  Pt had a GLF on 11/15/20 and sustained a right distal radius fracture which required a closed reduction and splinting. She was a candidate for surgery but due testing COVID + the week prior to going to the ER, surgery was not done.  Returned to ER 12/18/20 with LLE leg pain and redness x 5 days.  Unsure if she injured her LLE while falling but no obvious injuries.  Reports being sedentary around the time of DVT due to not feeling well.  Denies any prior surgeries or extended travel.  No hormone use  Never used tobacco.  No personal hx of malignancy. Up to date on age-appropriate cancer screenings.  No FH for VTE.  Protein s functional, protein c functional, factor ii dna analysis, factor v ledien, anticardiolipin antibodies neg.  Started on Xarelto 15 mg BID x 21 days then 20 mg daily.  Adherent to taking Xarelto exactly as instructed.  No further calf pain, redness or swelling but does notice a round lump to her left lower leg which she states has been present since DVT diagnosis. Says the lump hasn't worsened or improved.  No SOB or CP.  Elevates her leg when sitting.  Is much more active than she was previously.  Would like to have a f/u US.  Hoping to stop Xarelto if clot no longer present.  Has completed 3 months of therapy.    No past medical history on file.     No past surgical history on file.     No family history on file.     Social History     Tobacco Use   • Smoking status: Never Smoker   Substance Use Topics   • Alcohol use: No   • Drug use: No        Current Outpatient Medications on File " Prior to Visit   Medication Sig Dispense Refill   • rivaroxaban (XARELTO) 20 MG Tab tablet Take 1 Tab by mouth with dinner. 30 Tab 3   • azithromycin (ZITHROMAX) 250 MG TABS Take 2 tabs on day 1 then 1 tab on day 2-5. 6 Tab 0   • benzonatate (TESSALON) 100 MG CAPS Take 1 Cap by mouth 2 times a day as needed for Cough. 20 Cap 0   • azithromycin (ZITHROMAX) 250 MG TABS Take 1 tab per day for 4 days 4 Tab 0   • lansoprazole (PREVACID) 30 MG CPDR Take 30 mg by mouth every day.     • ipratropium-albuterol (DUONEB) 0.5-2.5 (3) MG/3ML nebulizer solution 3 mL by Nebulization route 4 times a day.     • fluticasone (FLOVENT HFA) 110 MCG/ACT AERO Inhale 2 Puffs by mouth 2 times a day.       No current facility-administered medications on file prior to visit.        Patient has no known allergies.     DIET AND EXERCISE:  Weight Change: none  Diet: common adult  Exercise: no regular exercise program     Review of Systems   Constitutional: Negative for chills and fever.   HENT: Negative for nosebleeds.    Respiratory: Negative for hemoptysis and shortness of breath.    Cardiovascular: Negative for chest pain and leg swelling.   Gastrointestinal: Negative for blood in stool and melena.   Genitourinary: Negative for hematuria.   Musculoskeletal: Negative for falls and myalgias.   Neurological: Negative for loss of consciousness.   Endo/Heme/Allergies: Does not bruise/bleed easily.         Objective:     There were no vitals taken for this visit.     Physical Exam   Constitutional: She is oriented to person, place, and time and well-developed, well-nourished, and in no distress.   Cardiovascular: Normal rate and normal heart sounds.   Pulmonary/Chest: Effort normal and breath sounds normal.   Abdominal: Soft. Bowel sounds are normal.   Musculoskeletal:         General: Edema present.      Comments: Silver dollar sized area of firmness to anterior aspect of tibia. Non tender. No redness/warmth.  Driss's neg.   Neurological: She is  alert and oriented to person, place, and time. Gait normal.   Skin: Skin is warm and dry.   Psychiatric: Mood, memory, affect and judgment normal.        DATA REVIEW    Lab Results   Component Value Date/Time    CHOLSTRLTOT 229 (H) 10/24/2020 08:38 AM     (H) 10/24/2020 08:38 AM    HDL 49 10/24/2020 08:38 AM    TRIGLYCERIDE 179 (H) 10/24/2020 08:38 AM       Lab Results   Component Value Date/Time    SODIUM 140 01/16/2021 08:28 AM    POTASSIUM 3.9 01/16/2021 08:28 AM    CHLORIDE 105 01/16/2021 08:28 AM    CO2 25 01/16/2021 08:28 AM    GLUCOSE 102 (H) 01/16/2021 08:28 AM    BUN 9 01/16/2021 08:28 AM    CREATININE 0.56 01/16/2021 08:28 AM     Lab Results   Component Value Date/Time    ALKPHOSPHAT 73 01/16/2021 08:28 AM    ASTSGOT 20 01/16/2021 08:28 AM    ALTSGPT 14 01/16/2021 08:28 AM    TBILIRUBIN 0.5 01/16/2021 08:28 AM       INR   Date Value Ref Range Status   12/29/2020 1.60  Final     No results found for: POCINR     12/18/20 LLE US   Left lower extremity -   Echolucent material fills the popliteal vein that has the appearance of    acute venous thrombosis.    Calf veins not well seen.    All other veins of the left lower extremity demonstrate normal flow    dynamics with no evidence of deep vein thrombosis.       Contralateral flow was obtained in the right common femoral vein and    appears to be normal.       Medical Decision Making:  Today's Assessment / Status / Plan:     1. Deep vein thrombosis (DVT) of proximal lower extremity, unspecified chronicity, unspecified laterality (HCC)  US-EXTREMITY VENOUS LOWER UNILAT LEFT        Indication for anticoagulation: LLE DVT possibly provoked by COVID and low risk ortho procedure.    Anti-Platelet/Anticoagulant Discussion:  Long discussion with patient regarding the risks and benefits of continuing or stopping anticoagulation therapy in this setting. Let her know the ACCP guidelines recommend anticoagulation stop after 3 months in patients with VTE provoked by  surgery (grade 1b) or a transient non surgical risk factor such as COVID (grade 2b). Possible provoking incidents include COVID virus and ortho procedure to RUE. Ongoing risk factors include obesity and risk for sedentary lifestyle though she is more active now. Her risk of major bleeding while taking an anticoagulant is 3.4% annually. Her risk for recurrent VTE after minor provoking risk factor is 15% at 3 years. We discussed the option of obtaining the remainder of her hypercoag w/u and she would like to forgo for now but will consider in the future. After much discussion, her goal is to stop taking Xarelto. Instructed to continue Xarelto for now and obtain repeat imaging. If no acute thrombosis noted, will switch her to ASA 81 mg daily. Emphasized the need for close surveillance for recurrent VTE and to seek emergent medical attention if she has these symptoms.    Anti-Coagulation Plan:  - continue taking Xarelto 20 mg daily with food  - get ultrasound of your leg done  - go to the ER for shortness of breath, chest pain, pain with deep inhalation, worsening leg swelling and/or pain in calf or leg   - avoid sedentary periods  - let all your providers know you take this medication  - don't stop this medication without permission from your doctor or our clinic  -  refills before you run out  - continue complete avoidance of tobacco products  - avoid hormonal therapies including estrogen or testosterone-containing meds, or raloxifene or tamoxifene (commonly used for osteoporosis)  - to avoid Aspirin and anti-inflammatories (eg. Advil, ibuprofen, Aleve, naproxen, etc) while anticoagulated   - Avoid skiing or other dangerous activities to reduce risk of head injury and brain bleeds  - elevate legs as much as possible, use compression stockings/socks if directed by your provider  - if any bleeding lasting 30min without stopping, please seek care with your PCP, urgent care, or ED  - if having any invasive  procedure, please make sure the doctor knows of your history of blood clots and current anticoagulation status  - let us know of any medication changes  - recommended to see your PCP to discuss if you need age-appropriate cancer screenings as a small % of blood clots may be caused by an underlying malignancy  - reversal agents for most blood thinners are now available and used if you have major bleeding    Smoking: continued complete abstinence    Physical Activity: frequency : goal is walking 3-4 times a week    Weight Management and Nutrition:exercise counseling and nutrition counseling    Instructed to follow-up with PCP for remainder of adult medical needs: yes  We will partner with other provider in the management of established vascular disease and cardiometabolic risk factors    Studies to Be Obtained: MARISOL US  Labs to Be Obtained: none    Follow up in: 2 weeks    [facility] EXAM 4     Cc:   Dr Bloch Dr [place]back

## 2021-03-24 NOTE — PATIENT INSTRUCTIONS
- continue taking Xarelto 20 mg daily with food  - get ultrasound of your leg done  - go to the ER for shortness of breath, chest pain, pain with deep inhalation, worsening leg swelling and/or pain in calf or leg   - avoid sedentary periods  - let all your providers know you take this medication  - don't stop this medication without permission from your doctor or our clinic  -  refills before you run out  - continue complete avoidance of tobacco products  - avoid hormonal therapies including estrogen or testosterone-containing meds, or raloxifene or tamoxifene (commonly used for osteoporosis)  - to avoid Aspirin and anti-inflammatories (eg. Advil, ibuprofen, Aleve, naproxen, etc) while anticoagulated   - Avoid skiing or other dangerous activities to reduce risk of head injury and brain bleeds  - elevate legs as much as possible, use compression stockings/socks if directed by your provider  - if any bleeding lasting 30min without stopping, please seek care with your PCP, urgent care, or ED  - if having any invasive procedure, please make sure the doctor knows of your history of blood clots and current anticoagulation status  - let us know of any medication changes  - recommended to see your PCP to discuss if you need age-appropriate cancer screenings as a small % of blood clots may be caused by an underlying malignancy  - reversal agents for most blood thinners are now available and used if you have major bleeding

## 2021-03-25 ENCOUNTER — HOSPITAL ENCOUNTER (OUTPATIENT)
Dept: RADIOLOGY | Facility: MEDICAL CENTER | Age: 71
End: 2021-03-25
Attending: NURSE PRACTITIONER
Payer: MEDICARE

## 2021-03-25 DIAGNOSIS — I82.4Y9 DEEP VEIN THROMBOSIS (DVT) OF PROXIMAL LOWER EXTREMITY, UNSPECIFIED CHRONICITY, UNSPECIFIED LATERALITY (HCC): ICD-10-CM

## 2021-03-25 PROCEDURE — 93971 EXTREMITY STUDY: CPT | Mod: LT

## 2021-03-26 PROCEDURE — 93971 EXTREMITY STUDY: CPT | Mod: 26 | Performed by: INTERNAL MEDICINE

## 2021-04-08 ENCOUNTER — ANTICOAGULATION VISIT (OUTPATIENT)
Dept: VASCULAR LAB | Facility: MEDICAL CENTER | Age: 71
End: 2021-04-08
Attending: INTERNAL MEDICINE
Payer: MEDICARE

## 2021-04-08 VITALS — DIASTOLIC BLOOD PRESSURE: 84 MMHG | HEART RATE: 81 BPM | SYSTOLIC BLOOD PRESSURE: 146 MMHG

## 2021-04-08 DIAGNOSIS — I82.4Y9 DEEP VEIN THROMBOSIS (DVT) OF PROXIMAL LOWER EXTREMITY, UNSPECIFIED CHRONICITY, UNSPECIFIED LATERALITY (HCC): ICD-10-CM

## 2021-04-08 PROCEDURE — 99212 OFFICE O/P EST SF 10 MIN: CPT | Performed by: NURSE PRACTITIONER

## 2021-04-08 PROCEDURE — 99214 OFFICE O/P EST MOD 30 MIN: CPT | Performed by: NURSE PRACTITIONER

## 2021-04-08 RX ORDER — ASPIRIN 81 MG/1
81 TABLET, CHEWABLE ORAL DAILY
COMMUNITY

## 2021-04-08 ASSESSMENT — ENCOUNTER SYMPTOMS
BRUISES/BLEEDS EASILY: 0
CLAUDICATION: 0
CHILLS: 0
FALLS: 0
BLOOD IN STOOL: 0
FEVER: 0
LOSS OF CONSCIOUSNESS: 0
SHORTNESS OF BREATH: 0
HEMOPTYSIS: 0

## 2021-04-08 NOTE — PATIENT INSTRUCTIONS
- stop taking Xarelto  - start taking aspirin 81 mg by mouth daily   - get labs and US prior to next visit in 3 months  - go to the ER for shortness of breath, chest pain, pain with deep inhalation, worsening leg swelling and/or pain in calf or leg   - avoid sedentary periods  - let all your providers know you take this medication  - don't stop this medication without permission from your doctor or our clinic  -  refills before you run out  - continue complete avoidance of tobacco products  - avoid hormonal therapies including estrogen or testosterone-containing meds, or raloxifene or tamoxifene (commonly used for osteoporosis)  - to avoid Aspirin and anti-inflammatories (eg. Advil, ibuprofen, Aleve, naproxen, etc) while anticoagulated   - Avoid skiing or other dangerous activities to reduce risk of head injury and brain bleeds  - elevate legs as much as possible, use compression stockings/socks if directed by your provider  - if any bleeding lasting 30min without stopping, please seek care with your PCP, urgent care, or ED  - if having any invasive procedure, please make sure the doctor knows of your history of blood clots and current anticoagulation status  - let us know of any medication changes  - recommended to see your PCP to discuss if you need age-appropriate cancer screenings as a small % of blood clots may be caused by an underlying malignancy  - reversal agents for most blood thinners are now available and used if you have major bleeding

## 2021-04-08 NOTE — Clinical Note
F/u LOT. This lady wants to finish hypercoag w/u that was obtained in ER and have another in 3 months even though both aren't necessary in my opinion. She's coming back to me in 3 months.

## 2021-04-08 NOTE — PROGRESS NOTES
"VASCULAR ANTI-COAGULATION VISIT  Subjective:   Cate Niño is a 70 y.o. female who presents today 4/8/2021 for   No chief complaint on file.      HPI: Patient here for vascular f/u.  Hx of LLE DVT in Dec 2020 after a GLF the month prior in which she sustained a right distal radius fx that she did not have surgery for due to testing COVID +, and therefore, missing the time frame for surgery.  Had closed reduction and splinting of right wrist.  F/u US showed essentially resolution of DVT although \"very mild chronic residual thrombosis seen in the popliteal vein, but the vein does not appear dilated or new acute thrombosis seen intraluminally.\"  Unsure if she injured her LLE while falling but no obvious injuries.  Reports being sedentary around the time of DVT due to not feeling well.  Denies any prior surgeries or extended travel.  No hormone use  Never used tobacco.  No personal hx of malignancy. Up to date on age-appropriate cancer screenings.  No FH for VTE.  Protein s functional, protein c functional, factor ii dna analysis, factor v ledien, anticardiolipin antibodies neg.  Adherent to taking Xarelto exactly as instructed.  No further calf pain, redness or swelling but does notice a round lump to her left lower leg which she states has been present since DVT diagnosis. Says the lump hasn't worsened or improved.  No SOB or CP.  Elevates her leg when sitting.  Is much more active than she was previously.  Would like to have a f/u US.  Hoping to stop Xarelto if clot no longer present.  Has completed 3 months of therapy.      Social History     Tobacco Use   • Smoking status: Never Smoker   Substance Use Topics   • Alcohol use: No   • Drug use: No     DIET AND EXERCISE:  Weight Change:none  Diet: common adult  Exercise: no regular exercise program     Review of Systems   Constitutional: Negative for chills and fever.   HENT: Negative for nosebleeds.    Respiratory: Negative for hemoptysis and shortness of " breath.    Cardiovascular: Positive for leg swelling. Negative for chest pain and claudication.   Gastrointestinal: Negative for blood in stool and melena.   Genitourinary: Negative for hematuria.   Musculoskeletal: Negative for falls.   Neurological: Negative for loss of consciousness.   Endo/Heme/Allergies: Does not bruise/bleed easily.      Objective:   There were no vitals filed for this visit.  There is no height or weight on file to calculate BMI.  Physical Exam  Constitutional:       Appearance: Normal appearance. She is obese.   Cardiovascular:      Rate and Rhythm: Normal rate and regular rhythm.      Heart sounds: Normal heart sounds.   Pulmonary:      Effort: Pulmonary effort is normal.      Breath sounds: Normal breath sounds.   Abdominal:      General: Bowel sounds are normal.      Palpations: Abdomen is soft.   Musculoskeletal:         General: Swelling present.      Comments: Silver dollar sized area of firmness to anterior aspect of tibia. Non tender. No redness/warmth. Unchanged from last visit.   Skin:     General: Skin is warm and dry.   Neurological:      Mental Status: She is alert.       Lab Results   Component Value Date    CHOLSTRLTOT 229 (H) 10/24/2020     (H) 10/24/2020    HDL 49 10/24/2020    TRIGLYCERIDE 179 (H) 10/24/2020      Lab Results   Component Value Date    PROTHROMBTM 14.9 (H) 12/18/2020    INR 1.60 12/29/2020         Lab Results   Component Value Date    SODIUM 140 01/16/2021    POTASSIUM 3.9 01/16/2021    CHLORIDE 105 01/16/2021    CO2 25 01/16/2021    GLUCOSE 102 (H) 01/16/2021    BUN 9 01/16/2021    CREATININE 0.56 01/16/2021        Lab Results   Component Value Date    WBC 6.8 01/16/2021    RBC 4.92 01/16/2021    HEMOGLOBIN 14.5 01/16/2021    HEMATOCRIT 46.3 01/16/2021    MCV 94.1 01/16/2021    MCH 29.5 01/16/2021    MCHC 31.3 (L) 01/16/2021    MPV 9.1 01/16/2021 12/18/20 LLE US   Left lower extremity -   Echolucent material fills the popliteal vein that has  "the appearance of    acute venous thrombosis.    Calf veins not well seen.    All other veins of the left lower extremity demonstrate normal flow    dynamics with no evidence of deep vein thrombosis.       Contralateral flow was obtained in the right common femoral vein and    appears to be normal.     3/25/21 LLE US   CONCLUSIONS   Prior 12/18/2020.   Left lower extremity venous duplex imaging.    No evidence of acute deep or superficial venous thrombosis.    Very mild chronic residual thrombosis seen in the popliteal vein, but the    vein does not appear dilated or new acute thrombosis seen intraluminally.      Area of concern in the anterior calf/shin with painful lump \"knot\": no    evidence of fluid collection or mass.      Hypercoaguability work-up completed? partial  Hypercoag w/u (OK to check WHILE ON ANTICOAG)  • Elevated D-dimer?  not tested  • Factor V leiden?  negative  • Factor II DNA analysis (prothrombin gene mutation)? negative  • Beta 2-glycoprotein-I aB IgG, IgM?  not tested  • Anticardiolipin antibodies?  negative  Hypercoag w/u (OK to check 2 WEEKS AFTER STOPPING ANTICOAG)  • Protein C functional deficiency?  negative - 138  • Protein S functional deficiency? negative - 101  • Antithrombin III functional deficiency? Not tested  • Lupus anticoagulant? Not tested      Medical Decision Making:  Today's Assessment / Status / Plan:     1. Deep vein thrombosis (DVT) of proximal lower extremity, unspecified chronicity, unspecified laterality (HCC)       Indication for anticoagulation: LLE DVT possibly provoked by COVID and low risk ortho procedure.    Anti-Platelet/Anti-Coagulant Tx: Xarelto 20 mg daily    Anti-Platelet/Anticoagulant Discussion:  Rediscussed ACCP guidelines for 3 months of therapy for VTE with provoking factors which in this case is ortho procedure and possibly COVID-19 infection. Her risk of recurrent is 3-15% at 4 years. Ongoing risk factors include obesity. She is less sedentary and " walking. Will stop Xarelto and start aspiring 81 mg daily with close surveillance for s/sx of recurrent VTE. Regarding anterior calf/shin with lump/knot she will monitor for worsening symptoms and/or redness, warmth, or increased pain. To f/u with PCP.    Despite no family history for VTE she wants to obtain further thrombophilia testing. She is also requesting f/u duplex in 3 months despite recent US results being essentially neg other than very mild chronic popliteal clot. After much discussion, she wants to pursue both in 3 months.    Anti-Coagulation Plan:  - stop taking Xarelto  - start taking aspirin 81 mg by mouth daily   - go to the ER for shortness of breath, chest pain, pain with deep inhalation, worsening leg swelling and/or pain in calf or leg   - avoid sedentary periods  - let all your providers know you take this medication  - don't stop this medication without permission from your doctor or our clinic  -  refills before you run out  - continue complete avoidance of tobacco products  - avoid hormonal therapies including estrogen or testosterone-containing meds, or raloxifene or tamoxifene (commonly used for osteoporosis)  - to avoid Aspirin and anti-inflammatories (eg. Advil, ibuprofen, Aleve, naproxen, etc) while anticoagulated   - Avoid skiing or other dangerous activities to reduce risk of head injury and brain bleeds  - elevate legs as much as possible, use compression stockings/socks if directed by your provider  - if any bleeding lasting 30min without stopping, please seek care with your PCP, urgent care, or ED  - if having any invasive procedure, please make sure the doctor knows of your history of blood clots and current anticoagulation status  - let us know of any medication changes  - recommended to see your PCP to discuss if you need age-appropriate cancer screenings as a small % of blood clots may be caused by an underlying malignancy  - reversal agents for most blood thinners are  now available and used if you have major bleeding    Smoking: continued complete abstinence    Physical Activity: frequency : goal is walking 3-4 times a week    Weight Management and Nutrition:exercise counseling and nutrition counseling    Instructed to follow-up with PCP for remainder of adult medical needs: yes  We will partner with other provider in the management of established vascular disease and cardiometabolic risk factors    Studies to Be Obtained: LLE US in 3 months  Labs to Be Obtained: d-dimer, beta glycoprotein antibodies, at iii, lupus anticoagulant    Follow up in: 3 months with imaging and labs completed prior    DORY Howard    CC:   Jennifer K Hornback, M.D. Dr Bloch

## 2021-05-05 ENCOUNTER — PATIENT MESSAGE (OUTPATIENT)
Dept: HEALTH INFORMATION MANAGEMENT | Facility: OTHER | Age: 71
End: 2021-05-05

## 2021-07-22 ENCOUNTER — HOSPITAL ENCOUNTER (OUTPATIENT)
Dept: RADIOLOGY | Facility: MEDICAL CENTER | Age: 71
End: 2021-07-22
Attending: NURSE PRACTITIONER
Payer: MEDICARE

## 2021-07-22 ENCOUNTER — HOSPITAL ENCOUNTER (OUTPATIENT)
Dept: LAB | Facility: MEDICAL CENTER | Age: 71
End: 2021-07-22
Attending: NURSE PRACTITIONER
Payer: MEDICARE

## 2021-07-22 DIAGNOSIS — I82.4Y9 DEEP VEIN THROMBOSIS (DVT) OF PROXIMAL LOWER EXTREMITY, UNSPECIFIED CHRONICITY, UNSPECIFIED LATERALITY (HCC): ICD-10-CM

## 2021-07-22 LAB — D DIMER PPP IA.FEU-MCNC: 0.27 UG/ML (FEU) (ref 0–0.5)

## 2021-07-22 PROCEDURE — 93971 EXTREMITY STUDY: CPT | Mod: LT

## 2021-07-22 PROCEDURE — 93971 EXTREMITY STUDY: CPT | Mod: 26,LT | Performed by: INTERNAL MEDICINE

## 2021-07-22 PROCEDURE — 85730 THROMBOPLASTIN TIME PARTIAL: CPT

## 2021-07-22 PROCEDURE — 85379 FIBRIN DEGRADATION QUANT: CPT

## 2021-07-22 PROCEDURE — 85520 HEPARIN ASSAY: CPT

## 2021-07-22 PROCEDURE — 36415 COLL VENOUS BLD VENIPUNCTURE: CPT

## 2021-07-22 PROCEDURE — 85301 ANTITHROMBIN III ANTIGEN: CPT

## 2021-07-22 PROCEDURE — 85300 ANTITHROMBIN III ACTIVITY: CPT

## 2021-07-22 PROCEDURE — 85610 PROTHROMBIN TIME: CPT

## 2021-07-22 PROCEDURE — 85613 RUSSELL VIPER VENOM DILUTED: CPT

## 2021-07-22 PROCEDURE — 86146 BETA-2 GLYCOPROTEIN ANTIBODY: CPT | Mod: 91

## 2021-07-23 LAB
APTT PPP: 31.5 SEC (ref 24.7–36)
INR PPP: 1 (ref 0.87–1.13)
LA PPP-IMP: NORMAL
PROTHROMBIN TIME: 12.9 SEC (ref 12–14.6)
SCREEN DRVVT: 43.2 SEC (ref 28–48)
UFH PPP CHRO-ACNC: <0.1 U/ML

## 2021-07-24 LAB
AT III ACT/NOR PPP CHRO: 93 % (ref 76–128)
AT III AG ACT/NOR PPP IA: 83 % (ref 82–136)
B2 GLYCOPROT1 IGA SER-ACNC: 6 SAU (ref 0–20)
B2 GLYCOPROT1 IGG SERPL IA-ACNC: 1 SGU (ref 0–20)
B2 GLYCOPROT1 IGM SERPL IA-ACNC: 2 SMU (ref 0–20)

## 2021-08-02 ENCOUNTER — ANTICOAGULATION VISIT (OUTPATIENT)
Dept: VASCULAR LAB | Facility: MEDICAL CENTER | Age: 71
End: 2021-08-02
Attending: INTERNAL MEDICINE
Payer: MEDICARE

## 2021-08-02 VITALS — DIASTOLIC BLOOD PRESSURE: 91 MMHG | SYSTOLIC BLOOD PRESSURE: 145 MMHG | HEART RATE: 82 BPM | OXYGEN SATURATION: 95 %

## 2021-08-02 DIAGNOSIS — Z79.01 CHRONIC ANTICOAGULATION: ICD-10-CM

## 2021-08-02 PROCEDURE — 99212 OFFICE O/P EST SF 10 MIN: CPT | Performed by: STUDENT IN AN ORGANIZED HEALTH CARE EDUCATION/TRAINING PROGRAM

## 2021-08-02 RX ORDER — B-COMPLEX WITH VITAMIN C
1 TABLET ORAL DAILY
COMMUNITY

## 2021-08-02 RX ORDER — MULTIVIT WITH MINERALS/LUTEIN
1 TABLET ORAL DAILY
COMMUNITY

## 2021-08-02 RX ORDER — COCONUT OIL
1 OIL (ML) MISCELLANEOUS DAILY
COMMUNITY

## 2021-08-02 RX ORDER — M-VIT,TX,IRON,MINS/CALC/FOLIC 27MG-0.4MG
1 TABLET ORAL DAILY
COMMUNITY

## 2021-08-02 RX ORDER — VITAMIN B COMPLEX
1000 TABLET ORAL DAILY
COMMUNITY

## 2021-08-02 NOTE — Clinical Note
Pt last seen 4/2021 for LOT appt. Pt was to stop Xarelto and start ASA 81mg and F/U in 3 months with repeat U/S. Pt in fact did not start ASA. Denies S/Sx of VTE/DVT. Repeat U/S was negative - reviewed with pt. Pt had no concerns and inquired if another U/S would be needed and if so when. Forwarding info to Liz FLORES for assessment and reccomendations.     Entered Level 2 Facility Fee for this visit, please assess if appropriate charge. Thanks!

## 2021-08-02 NOTE — PROGRESS NOTES
Target end date: TBD     Indication: DVT     Drug: Pt has not been taking anything other than herbals, specifically Garlic for blood thinning.      CHADsVASC = N/A    Health Status Since Last Assessment   Patient denies any new relevant medical problems, ED visits or hospitalizations   Patient denies any embolic events (stroke/tia/systemic embolism)    Bleeding Risk Assessment     No Epistaxis   Pt denies any excessive or unusual bleeding/hematomas.  Pt denies any GI bleeds or hematemesis.  Pt denies any concerning daily headache or sub dural hematoma symptoms.     Pt denies any hematuria or abnormal vaginal bleeding.   Latest Hemoglobin 14.5 (1/16/21)   ETOH overuse None     Creatinine Clearance/Renal Function     Latest ClCr greater than 50mL/min 1/2021    Hepatic function   Latest LFTs WNL 1/2021   Pt denies any history of liver dysfunction      Examination   Blood Pressure 145/91 - pt not taking antihypertensives. Suggested to follow up with provider   Symptomatic hypotension No   Significant gait impairment/imbalance/high risk for falls? Pt uses a cane to get around.    Drugs:   Zinc, Vit C & D, Potassium, coconut oil, MVI, and Garlic.    Final Assessment and Recommendations:   Pt last seen 4/2021 for LOT appt. Pt was to stop Xarelto and start ASA 81mg and F/U in 3 months with repeat U/S. Pt in fact did not start ASA. Denies S/Sx of VTE/DVT. Repeat U/S was negative - reviewed with pt. Pt had no concerns and inquired if another U/S would be needed and if so when. Forwarding info to Liz FLORES for assessment and reccomendations.     Follow up:   None at this time. Pending NP review.

## 2021-08-09 ENCOUNTER — TELEPHONE (OUTPATIENT)
Dept: VASCULAR LAB | Facility: MEDICAL CENTER | Age: 71
End: 2021-08-09

## 2021-08-09 NOTE — TELEPHONE ENCOUNTER
Reviewed recent labs. D-dimer, beta 2 microglobulin antibodies, antithrombin III all neg. US neg for DVT. Attempted to call patient. Unable to leave a message. Will sent patient a [employer] message letting her know I reviewed the results.    Liz FLORES

## 2021-10-28 ENCOUNTER — HOSPITAL ENCOUNTER (OUTPATIENT)
Dept: LAB | Facility: MEDICAL CENTER | Age: 71
End: 2021-10-28
Attending: FAMILY MEDICINE
Payer: MEDICARE

## 2021-10-28 LAB
ALBUMIN SERPL BCP-MCNC: 4.3 G/DL (ref 3.2–4.9)
ALBUMIN/GLOB SERPL: 1.7 G/DL
ALP SERPL-CCNC: 67 U/L (ref 30–99)
ALT SERPL-CCNC: 14 U/L (ref 2–50)
ANION GAP SERPL CALC-SCNC: 9 MMOL/L (ref 7–16)
AST SERPL-CCNC: 17 U/L (ref 12–45)
BASOPHILS # BLD AUTO: 0.5 % (ref 0–1.8)
BASOPHILS # BLD: 0.03 K/UL (ref 0–0.12)
BILIRUB SERPL-MCNC: 0.8 MG/DL (ref 0.1–1.5)
BUN SERPL-MCNC: 11 MG/DL (ref 8–22)
CALCIUM SERPL-MCNC: 9.3 MG/DL (ref 8.5–10.5)
CHLORIDE SERPL-SCNC: 106 MMOL/L (ref 96–112)
CHOLEST SERPL-MCNC: 222 MG/DL (ref 100–199)
CO2 SERPL-SCNC: 27 MMOL/L (ref 20–33)
CREAT SERPL-MCNC: 0.68 MG/DL (ref 0.5–1.4)
EOSINOPHIL # BLD AUTO: 0.11 K/UL (ref 0–0.51)
EOSINOPHIL NFR BLD: 2 % (ref 0–6.9)
ERYTHROCYTE [DISTWIDTH] IN BLOOD BY AUTOMATED COUNT: 42.1 FL (ref 35.9–50)
GLOBULIN SER CALC-MCNC: 2.6 G/DL (ref 1.9–3.5)
GLUCOSE SERPL-MCNC: 98 MG/DL (ref 65–99)
HCT VFR BLD AUTO: 44.4 % (ref 37–47)
HDLC SERPL-MCNC: 45 MG/DL
HGB BLD-MCNC: 14.3 G/DL (ref 12–16)
IMM GRANULOCYTES # BLD AUTO: 0.05 K/UL (ref 0–0.11)
IMM GRANULOCYTES NFR BLD AUTO: 0.9 % (ref 0–0.9)
LDLC SERPL CALC-MCNC: 134 MG/DL
LYMPHOCYTES # BLD AUTO: 2.27 K/UL (ref 1–4.8)
LYMPHOCYTES NFR BLD: 40.4 % (ref 22–41)
MCH RBC QN AUTO: 30.8 PG (ref 27–33)
MCHC RBC AUTO-ENTMCNC: 32.2 G/DL (ref 33.6–35)
MCV RBC AUTO: 95.5 FL (ref 81.4–97.8)
MONOCYTES # BLD AUTO: 0.47 K/UL (ref 0–0.85)
MONOCYTES NFR BLD AUTO: 8.4 % (ref 0–13.4)
NEUTROPHILS # BLD AUTO: 2.69 K/UL (ref 2–7.15)
NEUTROPHILS NFR BLD: 47.8 % (ref 44–72)
NRBC # BLD AUTO: 0 K/UL
NRBC BLD-RTO: 0 /100 WBC
PLATELET # BLD AUTO: 237 K/UL (ref 164–446)
PMV BLD AUTO: 10.1 FL (ref 9–12.9)
POTASSIUM SERPL-SCNC: 4.3 MMOL/L (ref 3.6–5.5)
PROT SERPL-MCNC: 6.9 G/DL (ref 6–8.2)
RBC # BLD AUTO: 4.65 M/UL (ref 4.2–5.4)
SODIUM SERPL-SCNC: 142 MMOL/L (ref 135–145)
TRIGL SERPL-MCNC: 213 MG/DL (ref 0–149)
TSH SERPL DL<=0.005 MIU/L-ACNC: 5 UIU/ML (ref 0.38–5.33)
WBC # BLD AUTO: 5.6 K/UL (ref 4.8–10.8)

## 2021-10-28 PROCEDURE — 80053 COMPREHEN METABOLIC PANEL: CPT

## 2021-10-28 PROCEDURE — 84443 ASSAY THYROID STIM HORMONE: CPT

## 2021-10-28 PROCEDURE — 36415 COLL VENOUS BLD VENIPUNCTURE: CPT

## 2021-10-28 PROCEDURE — 80061 LIPID PANEL: CPT

## 2021-10-28 PROCEDURE — 85025 COMPLETE CBC W/AUTO DIFF WBC: CPT

## 2021-11-15 ENCOUNTER — HOSPITAL ENCOUNTER (OUTPATIENT)
Dept: RADIOLOGY | Facility: MEDICAL CENTER | Age: 71
End: 2021-11-15
Attending: FAMILY MEDICINE
Payer: MEDICARE

## 2021-11-15 DIAGNOSIS — Z12.31 ENCOUNTER FOR MAMMOGRAM TO ESTABLISH BASELINE MAMMOGRAM: ICD-10-CM

## 2021-11-15 PROCEDURE — 77063 BREAST TOMOSYNTHESIS BI: CPT

## 2021-12-14 ENCOUNTER — ANTICOAGULATION MONITORING (OUTPATIENT)
Dept: CARDIOLOGY | Facility: MEDICAL CENTER | Age: 71
End: 2021-12-14

## 2021-12-14 NOTE — PROGRESS NOTES
Discharged from [facility].  Pt transitioned to [facility] by GRICELDA Hallman, Clinical Pharmacist, CDE, CACP

## 2022-02-17 ENCOUNTER — PATIENT MESSAGE (OUTPATIENT)
Dept: HEALTH INFORMATION MANAGEMENT | Facility: OTHER | Age: 72
End: 2022-02-17
Payer: MEDICARE

## 2022-02-28 ENCOUNTER — HOSPITAL ENCOUNTER (OUTPATIENT)
Dept: LAB | Facility: MEDICAL CENTER | Age: 72
End: 2022-02-28
Attending: FAMILY MEDICINE
Payer: MEDICARE

## 2022-02-28 LAB
ALBUMIN SERPL BCP-MCNC: 4.2 G/DL (ref 3.2–4.9)
ALBUMIN/GLOB SERPL: 1.4 G/DL
ALP SERPL-CCNC: 66 U/L (ref 30–99)
ALT SERPL-CCNC: 12 U/L (ref 2–50)
ANION GAP SERPL CALC-SCNC: 10 MMOL/L (ref 7–16)
AST SERPL-CCNC: 21 U/L (ref 12–45)
BILIRUB SERPL-MCNC: 0.5 MG/DL (ref 0.1–1.5)
BUN SERPL-MCNC: 12 MG/DL (ref 8–22)
CALCIUM SERPL-MCNC: 9.9 MG/DL (ref 8.5–10.5)
CHLORIDE SERPL-SCNC: 105 MMOL/L (ref 96–112)
CO2 SERPL-SCNC: 25 MMOL/L (ref 20–33)
CREAT SERPL-MCNC: 0.66 MG/DL (ref 0.5–1.4)
FERRITIN SERPL-MCNC: 79.2 NG/ML (ref 10–291)
GLOBULIN SER CALC-MCNC: 2.9 G/DL (ref 1.9–3.5)
GLUCOSE SERPL-MCNC: 87 MG/DL (ref 65–99)
IRON SATN MFR SERPL: 21 % (ref 15–55)
IRON SERPL-MCNC: 78 UG/DL (ref 40–170)
POTASSIUM SERPL-SCNC: 4.5 MMOL/L (ref 3.6–5.5)
PROT SERPL-MCNC: 7.1 G/DL (ref 6–8.2)
SODIUM SERPL-SCNC: 140 MMOL/L (ref 135–145)
TIBC SERPL-MCNC: 364 UG/DL (ref 250–450)
UIBC SERPL-MCNC: 286 UG/DL (ref 110–370)

## 2022-02-28 PROCEDURE — 83540 ASSAY OF IRON: CPT

## 2022-02-28 PROCEDURE — 80053 COMPREHEN METABOLIC PANEL: CPT

## 2022-02-28 PROCEDURE — 83550 IRON BINDING TEST: CPT

## 2022-02-28 PROCEDURE — 36415 COLL VENOUS BLD VENIPUNCTURE: CPT

## 2022-02-28 PROCEDURE — 82728 ASSAY OF FERRITIN: CPT

## 2022-07-09 ENCOUNTER — TELEPHONE (OUTPATIENT)
Dept: HEALTH INFORMATION MANAGEMENT | Facility: OTHER | Age: 72
End: 2022-07-09
Payer: MEDICARE

## 2022-10-14 ENCOUNTER — DOCUMENTATION (OUTPATIENT)
Dept: HEALTH INFORMATION MANAGEMENT | Facility: OTHER | Age: 72
End: 2022-10-14
Payer: MEDICARE

## 2023-03-10 ENCOUNTER — APPOINTMENT (OUTPATIENT)
Dept: RADIOLOGY | Facility: MEDICAL CENTER | Age: 73
End: 2023-03-10
Attending: EMERGENCY MEDICINE
Payer: COMMERCIAL

## 2023-03-10 ENCOUNTER — HOSPITAL ENCOUNTER (EMERGENCY)
Facility: MEDICAL CENTER | Age: 73
End: 2023-03-10
Attending: EMERGENCY MEDICINE
Payer: COMMERCIAL

## 2023-03-10 VITALS
SYSTOLIC BLOOD PRESSURE: 170 MMHG | HEART RATE: 88 BPM | RESPIRATION RATE: 16 BRPM | BODY MASS INDEX: 36.32 KG/M2 | TEMPERATURE: 98.2 F | OXYGEN SATURATION: 95 % | WEIGHT: 205 LBS | HEIGHT: 63 IN | DIASTOLIC BLOOD PRESSURE: 83 MMHG

## 2023-03-10 DIAGNOSIS — S90.31XA CONTUSION OF RIGHT FOOT, INITIAL ENCOUNTER: ICD-10-CM

## 2023-03-10 PROCEDURE — 99284 EMERGENCY DEPT VISIT MOD MDM: CPT

## 2023-03-10 PROCEDURE — 700102 HCHG RX REV CODE 250 W/ 637 OVERRIDE(OP): Performed by: EMERGENCY MEDICINE

## 2023-03-10 PROCEDURE — A9270 NON-COVERED ITEM OR SERVICE: HCPCS | Performed by: EMERGENCY MEDICINE

## 2023-03-10 PROCEDURE — 73660 X-RAY EXAM OF TOE(S): CPT | Mod: RT

## 2023-03-10 RX ORDER — ACETAMINOPHEN 325 MG/1
650 TABLET ORAL ONCE
Status: COMPLETED | OUTPATIENT
Start: 2023-03-10 | End: 2023-03-10

## 2023-03-10 RX ADMIN — ACETAMINOPHEN 650 MG: 325 TABLET, FILM COATED ORAL at 15:31

## 2023-03-10 ASSESSMENT — FIBROSIS 4 INDEX: FIB4 SCORE: 1.82

## 2023-03-10 NOTE — LETTER
"  FORM C-4:  EMPLOYEE’S CLAIM FOR COMPENSATION/ REPORT OF INITIAL TREATMENT  EMPLOYEE’S CLAIM - PROVIDE ALL INFORMATION REQUESTED   First Name Cate Last Name Salinas Birthdate 1950  Sex female Claim Number   Home Address [address] [place]             Zip [address]                                   Age  72 y.o. 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 (if applicable) In a classroom with Students    How did this injury or occupational disease occur? Be specific and answer in detail. 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 It was heavy and used for art  and painting for students.   If you believe that you have an occupational disease, when did you first have knowledge of the disability and it relationship to your employment? N/A Witnesses to the Accident  another teacher in the classroom   Nature of Injury or Occupational Disease  Workers' Compensation Part(s) of Body Injured or Affected  Foot (R), Toe (R), Toe (R)    I CERTIFY THAT THE ABOVE IS TRUE AND CORRECT TO THE BEST OF MY KNOWLEDGE AND THAT I HAVE PROVIDED THIS " INFORMATION IN ORDER TO OBTAIN THE BENEFITS OF [place]).  I HEREBY AUTHORIZE ANY PHYSICIAN, CHIROPRACTOR, SURGEON, PRACTITIONER, OR OTHER PERSON, ANY HOSPITAL, INCLUDING [facility] OR [facility], ANY MEDICAL SERVICE ORGANIZATION, ANY INSURANCE COMPANY, OR OTHER INSTITUTION OR ORGANIZATION TO RELEASE TO EACH OTHER, ANY MEDICAL OR OTHER INFORMATION, INCLUDING BENEFITS PAID OR PAYABLE, PERTINENT TO THIS INJURY OR DISEASE, EXCEPT INFORMATION RELATIVE TO DIAGNOSIS, TREATMENT AND/OR COUNSELING FOR AIDS, PSYCHOLOGICAL CONDITIONS, ALCOHOL OR CONTROLLED SUBSTANCES, FOR WHICH I MUST GIVE SPECIFIC AUTHORIZATION.  A PHOTOSTAT OF THIS AUTHORIZATION SHALL BE AS VALID AS THE ORIGINAL.  Date                    Place  [facility]          Employee’s Signature   THIS REPORT MUST BE COMPLETED AND MAILED WITHIN 3 WORKING DAYS OF TREATMENT   Place [facility], EMERGENCY DEPT                       Name of Facility [facility]   Date  3/10/2023 Diagnosis  (S90.31XA) Contusion of right foot, initial encounter Is there evidence the injured employee was under the influence of alcohol and/or another controlled substance at the time of accident?   Hour  2:54 PM Description of Injury or Disease  Contusion of right foot, initial encounter No   Treatment  H&P  Have you advised the patient to remain off work five days or more?         No   X-Ray Findings  Negative If Yes   From Date    To Date      From information given by the employee, together with medical evidence, can you directly connect this injury or occupational disease as job incurred? Yes If No, is employee capable of: Full Duty  No Modified Duty  Yes   Is additional medical care by a physician indicated? No  Comments:not anticipated If Modified Duty, Specify any Limitations / Restrictions   Wear ortho shoe  "for 1 week as needed for pain--adjust work duties accordingly   Do you know of any previous injury or disease contributing to this condition or occupational disease? No    Date 3/26/2023 Print Doctor’s Name Con Marcelino certify the employer’s copy of this form was mailed on:   Address [address] [place] [address]  [place] [id number] [phone] INSURER’S USE ONLY   Provider’s Tax ID Number [id number] Telephone Dept: [phone]    Doctor’s Signature davina-CON Mera M.D. Degree  M.D.      Form C-4 (rev.10/07)                                                                         BRIEF DESCRIPTION OF RIGHTS AND BENEFITS  (Pursuant to [place] 616C.050)    Notice of Injury or Occupational Disease (Incident Report Form C-1): If an injury or occupational disease (OD) arises out of and in the course of employment, you must provide written notice to your employer as soon as practicable, but no later than 7 days after the accident or OD. Your employer shall maintain a sufficient supply of the required forms.    Claim for Compensation (Form C-4): If medical treatment is sought, the form C-4 is available at the place of initial treatment. A completed \"Claim for Compensation\" (Form C-4) must be filed within 90 days after an accident or OD. The treating physician or chiropractor must, within 3 working days after treatment, complete and mail to the employer, the employer's insurer and third-party , the Claim for Compensation.    Medical Treatment: If you require medical treatment for your on-the-job injury or OD, you may be required to select a physician or chiropractor from a list provided by your workers’ compensation insurer, if it has contracted with an Organization for Managed Care (MCO) or Preferred Provider Organization (PPO) or providers of health care. If your employer has not entered into a contract with an MCO or PPO, you may select a physician or chiropractor from the Panel of Physicians and Chiropractors. " Any medical costs related to your industrial injury or OD will be paid by your insurer.    Temporary Total Disability (TTD): If your doctor has certified that you are unable to work for a period of at least 5 consecutive days, or 5 cumulative days in a 20-day period, or places restrictions on you that your employer does not accommodate, you may be entitled to TTD compensation.    Temporary Partial Disability (TPD): If the wage you receive upon reemployment is less than the compensation for TTD to which you are entitled, the insurer may be required to pay you TPD compensation to make up the difference. TPD can only be paid for a maximum of 24 months.    Permanent Partial Disability (PPD): When your medical condition is stable and there is an indication of a PPD as a result of your injury or OD, within 30 days, your insurer must arrange for an evaluation by a rating physician or chiropractor to determine the degree of your PPD. The amount of your PPD award depends on the date of injury, the results of the PPD evaluation, your age and wage.    Permanent Total Disability (PTD): If you are medically certified by a treating physician or chiropractor as permanently and totally disabled and have been granted a PTD status by your insurer, you are entitled to receive monthly benefits not to exceed 66 2/3% of your average monthly wage. The amount of your PTD payments is subject to reduction if you previously received a lump-sum PPD award.    Vocational Rehabilitation Services: You may be eligible for vocational rehabilitation services if you are unable to return to the job due to a permanent physical impairment or permanent restrictions as a result of your injury or occupational disease.    Transportation and Per Macy Reimbursement: You may be eligible for travel expenses and per macy associated with medical treatment.    Reopening: You may be able to reopen your claim if your condition worsens after claim closure.     Appeal  Process: If you disagree with a written determination issued by the insurer or the insurer does not respond to your request, you may appeal to the Department of Administration, , by following the instructions contained in your determination letter. You must appeal the determination within 70 days from the date of the determination letter at [address], [address], [place], [place] [address], or [address], [address], [place], [place] [address]. If you disagree with the  decision, you may appeal to the Department of Administration, . You must file your appeal within 30 days from the date of the  decision letter at [address], [address], [place], [place] [address], or [address], [address], [place], [place] [address]. If you disagree with a decision of an , you may file a petition for judicial review with the District Court. You must do so within 30 days of the Appeal Officer’s decision. You may be represented by an  at your own expense or you may contact the [facility] for possible representation.    [place]  for Injured Workers (NAIW): If you disagree with a  decision, you may request that NAIW represent you without charge at an  Hearing. For information regarding denial of benefits, you may contact the [facility] at: [address], [address], [place] [address], [phone], or [address], [address], [place] [address], [phone]    To File a Complaint with the Division: If you wish to file a complaint with the  of the Division of Industrial Relations (DIR),  please contact the Workers’ Compensation Section, [address], [address], [place], [place] [address], telephone [phone]) [phone], or [address], [address], [place], [place] [address], telephone [phone].    For assistance with Workers’ Compensation  Issues: You may contact the [facility] Office for Consumer Health Assistance, [address] [facility] [address] [facility] [address] [facility], [address], [place] [address], Toll Free 1-468.892.2206, Web site: http://[facility].[place].gov/Programs/SIDNEY E-mail: sidney@[facility].[place].gov  D-2 (rev. 10/20)              __________________________________________________________________                                    _________________            Employee Name / Signature                                                                                                                            Date

## 2023-03-10 NOTE — ED PROVIDER NOTES
ED Provider Note    CHIEF COMPLAINT  Chief Complaint   Patient presents with    Foot Pain     Pt bib ems. Pt states she was @ work and an art easel fell on her R foot. Pt endorsing pain to her R toes.   CMS intact to R foot.          HPI/ROS  LIMITATION TO HISTORY   Select: : None  \    Cate Niño is a 72 y.o. female who presents complaining of right foot pain.  She was in an art class, and a student knocked over a old, heavy wooden easel which pivoted over and landed right on her last 2 toes of her right foot.  She denies pain elsewhere.  We talked about her blood pressure.  She states that she has a history of high blood pressure had stopped taking medicine because whenever her blood pressure was checked it was normal.    PAST MEDICAL HISTORY   Hypertension    SURGICAL HISTORY  patient denies any surgical history    FAMILY HISTORY  History reviewed. No pertinent family history.    SOCIAL HISTORY  Social History     Tobacco Use    Smoking status: Never    Smokeless tobacco: Not on file   Substance and Sexual Activity    Alcohol use: No    Drug use: No    Sexual activity: Not on file       CURRENT MEDICATIONS  Home Medications       Reviewed by Liliya Leyva R.N. (Registered Nurse) on 03/10/23 at 1418  Med List Status: Not Addressed     Medication Last Dose Status   Ascorbic Acid (VITAMIN C) 1000 MG Tab  Active   aspirin (ASA) 81 MG Chew Tab chewable tablet  Active   Coconut Oil Oil  Active   fluticasone (FLOVENT HFA) 110 MCG/ACT AERO  Active   Garlic 100 MG Tab  Active   ipratropium-albuterol (DUONEB) 0.5-2.5 (3) MG/3ML nebulizer solution  Active   lansoprazole (PREVACID) 30 MG CPDR  Active   Potassium 99 MG Tab  Active   therapeutic multivitamin-minerals (THERAGRAN-M) Tab  Active   vitamin D (CHOLECALCIFEROL) 1000 Unit (25 mcg) Tab  Active   Zinc 100 MG Tab  Active                    ALLERGIES  No Known Allergies    PHYSICAL EXAM  VITAL SIGNS: BP (!) 211/98   Pulse 90   Temp 36.4 °C (97.5 °F)  "(Temporal)   Resp 16   Ht 1.6 m (5' 3\")   Wt 93 kg (205 lb)   SpO2 97%   BMI 36.31 kg/m²    Constitutional: Well appearing patient in no acute distress.  Extremities: Right foot: She has some edema and ecchymosis of the fourth toe which is quite tender to touch, less so over the fifth toe.  There is no tenderness over the foot itself.  There is no tenderness of the ankle or the leg.      DIAGNOSTIC STUDIES / PROCEDURES      RADIOLOGY  I have independently interpreted the diagnostic imaging associated with this visit and am waiting the final reading from the radiologist.   My preliminary interpretation is as follows: Arthritis, no fracture  Radiologist interpretation:   DX-TOE(S) 2+ RIGHT   Final Result      1.  No definite acute fracture or dislocation. If pain persists, repeat radiographs in 7-10 days is recommended.   2.  Mild to moderate hallux valgus deformity and mild first MTP degenerative changes.            COURSE & MEDICAL DECISION MAKING    ED Observation Status? No; Patient does not meet criteria for ED Observation.     INITIAL ASSESSMENT, COURSE AND PLAN  Care Narrative: Patient comes in with injury to her foot.  Will check x-rays.  We did talk about her blood pressure, I recommended she keep a journal of her blood pressure taking this with her to her primary care appointment with Dr. Muse in the next 1 to 2 weeks to discuss further treatment.  She points out that her readings have been in the normal range without any medication prior to today's visit.  HTN/IDDM FOLLOW UP:  The patient has known hypertension and is being followed by their primary care doctor    DISPOSITION AND DISCUSSIONS  X-ray showed no evidence of fracture.  I have asked her to follow-up in a week's time if she is still hurting for repeat imaging as sometimes initial x-rays can miss a fracture.  Referral for occupational health was provided.    Instructions on foot contusions.  She has a cane that she will be using for " assistance with ambulation.    FINAL DIAGNOSIS  1. Contusion of right foot, initial encounter    2.  Essential hypertension       Electronically signed by: Con Marcelino M.D., 3/10/2023 2:47 PM

## 2023-03-10 NOTE — ED TRIAGE NOTES
"Chief Complaint   Patient presents with    Foot Pain     Pt bib ems. Pt states she was @ work and an art easel fell on her R foot. Pt endorsing pain to her R toes.   CMS intact to R foot.      BP (!) 179/102   Pulse 78   Temp 36.4 °C (97.5 °F) (Temporal)   Resp 16   Ht 1.6 m (5' 3\")   Wt 93 kg (205 lb)   SpO2 95%   BMI 36.31 kg/m²     "

## 2023-03-10 NOTE — LETTER
"  FORM C-4:  EMPLOYEE’S CLAIM FOR COMPENSATION/ REPORT OF INITIAL TREATMENT  EMPLOYEE’S CLAIM - PROVIDE ALL INFORMATION REQUESTED   First Name Cate Last Name Salinas Birthdate 1950  Sex female Claim Number   Home Address [address] [facility]             Zip [address]                                   Age  72 y.o. 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 (if applicable)     How did this injury or occupational disease occur? Be specific and answer in detail. Use additional sheet if necessary)     If you believe that you have an occupational disease, when did you first have knowledge of the disability and it relationship to your employment?  Witnesses to the Accident     Nature of Injury or Occupational Disease   Part(s) of Body Injured or Affected  , ,     I CERTIFY THAT THE ABOVE IS TRUE AND CORRECT TO THE BEST OF MY KNOWLEDGE AND THAT I HAVE PROVIDED THIS INFORMATION IN ORDER TO OBTAIN THE BENEFITS OF [place]  I HEREBY AUTHORIZE ANY PHYSICIAN, CHIROPRACTOR, SURGEON, PRACTITIONER, OR OTHER PERSON, ANY HOSPITAL, INCLUDING [facility], ANY MEDICAL SERVICE ORGANIZATION, ANY INSURANCE " COMPANY, OR OTHER INSTITUTION OR ORGANIZATION TO RELEASE TO EACH OTHER, ANY MEDICAL OR OTHER INFORMATION, INCLUDING BENEFITS PAID OR PAYABLE, PERTINENT TO THIS INJURY OR DISEASE, EXCEPT INFORMATION RELATIVE TO DIAGNOSIS, TREATMENT AND/OR COUNSELING FOR AIDS, PSYCHOLOGICAL CONDITIONS, ALCOHOL OR CONTROLLED SUBSTANCES, FOR WHICH I MUST GIVE SPECIFIC AUTHORIZATION.  A PHOTOSTAT OF THIS AUTHORIZATION SHALL BE AS VALID AS THE ORIGINAL.  Date                                      Place                                                                             Employee’s Signature   THIS REPORT MUST BE COMPLETED AND MAILED WITHIN 3 WORKING DAYS OF TREATMENT   Place [facility], EMERGENCY DEPT                       Name of Facility [facility]   Date  3/10/2023 Diagnosis  (S90.31XA) Contusion of right foot, initial encounter Is there evidence the injured employee was under the influence of alcohol and/or another controlled substance at the time of accident?   Hour  2:49 PM Description of Injury or Disease  Contusion of right foot, initial encounter No   Treatment  H&P  Have you advised the patient to remain off work five days or more?         No   X-Ray Findings  Negative If Yes   From Date    To Date      From information given by the employee, together with medical evidence, can you directly connect this injury or occupational disease as job incurred? Yes If No, is employee capable of: Full Duty  No Modified Duty  Yes   Is additional medical care by a physician indicated? No  Comments:not anticipated If Modified Duty, Specify any Limitations / Restrictions   Wear ortho shoe for 1 week as needed for pain--adjust work duties accordingly   Do you know of any previous injury or disease contributing to this condition or occupational disease? No    Date 3/26/2023 Print Doctor’s Name Con Marcelino I certify the employer’s copy of this form was mailed on:   Address [address] [place] [address]  "[id number]  [phone] INSURER’S USE ONLY   Provider’s Tax ID Number [id number] Telephone Dept: [phone]    Doctor’s Signature ANTONIA Estrada M.D. Degree        Form C-4 (rev.10/07)                                                                         BRIEF DESCRIPTION OF RIGHTS AND BENEFITS  (Pursuant to [place] 616[place])    Notice of Injury or Occupational Disease (Incident Report Form C-1): If an injury or occupational disease (OD) arises out of and in the course of employment, you must provide written notice to your employer as soon as practicable, but no later than 7 days after the accident or OD. Your employer shall maintain a sufficient supply of the required forms.    Claim for Compensation (Form C-4): If medical treatment is sought, the form C-4 is available at the place of initial treatment. A completed \"Claim for Compensation\" (Form C-4) must be filed within 90 days after an accident or OD. The treating physician or chiropractor must, within 3 working days after treatment, complete and mail to the employer, the employer's insurer and third-party , the Claim for Compensation.    Medical Treatment: If you require medical treatment for your on-the-job injury or OD, you may be required to select a physician or chiropractor from a list provided by your workers’ compensation insurer, if it has contracted with an Organization for Managed Care (MCO) or Preferred Provider Organization (PPO) or providers of health care. If your employer has not entered into a contract with an MCO or PPO, you may select a physician or chiropractor from the Panel of Physicians and Chiropractors. Any medical costs related to your industrial injury or OD will be paid by your insurer.    Temporary Total Disability (TTD): If your doctor has certified that you are unable to work for a period of at least 5 consecutive days, or 5 cumulative days in a 20-day period, or places restrictions on you that your employer does " not accommodate, you may be entitled to TTD compensation.    Temporary Partial Disability (TPD): If the wage you receive upon reemployment is less than the compensation for TTD to which you are entitled, the insurer may be required to pay you TPD compensation to make up the difference. TPD can only be paid for a maximum of 24 months.    Permanent Partial Disability (PPD): When your medical condition is stable and there is an indication of a PPD as a result of your injury or OD, within 30 days, your insurer must arrange for an evaluation by a rating physician or chiropractor to determine the degree of your PPD. The amount of your PPD award depends on the date of injury, the results of the PPD evaluation, your age and wage.    Permanent Total Disability (PTD): If you are medically certified by a treating physician or chiropractor as permanently and totally disabled and have been granted a PTD status by your insurer, you are entitled to receive monthly benefits not to exceed 66 2/3% of your average monthly wage. The amount of your PTD payments is subject to reduction if you previously received a lump-sum PPD award.    Vocational Rehabilitation Services: You may be eligible for vocational rehabilitation services if you are unable to return to the job due to a permanent physical impairment or permanent restrictions as a result of your injury or occupational disease.    Transportation and Per Macy Reimbursement: You may be eligible for travel expenses and per macy associated with medical treatment.    Reopening: You may be able to reopen your claim if your condition worsens after claim closure.     Appeal Process: If you disagree with a written determination issued by the insurer or the insurer does not respond to your request, you may appeal to the Department of Administration, , by following the instructions contained in your determination letter. You must appeal the determination within 70 days from the  date of the determination letter at [address], [address], [place], [place] [address], or [address], [address], [place], [place] [address]. If you disagree with the  decision, you may appeal to the Department of Administration, . You must file your appeal within 30 days from the date of the  decision letter at [address], [address], [place], [place] [address], or [address], [address], [place], [place] [address]. If you disagree with a decision of an , you may file a petition for judicial review with the District Court. You must do so within 30 days of the Appeal Officer’s decision. You may be represented by an  at your own expense or you may contact the [facility] for possible representation.    [place]  for Injured Workers (NAIW): If you disagree with a  decision, you may request that NAIW represent you without charge at an  Hearing. For information regarding denial of benefits, you may contact the [facility] at: [address], [address], [place] [address], [phone], or [address], [address], [place] [address], [phone]    To File a Complaint with the Division: If you wish to file a complaint with the  of the Division of Industrial Relations (DIR),  please contact the Workers’ Compensation Section, [address], [address], [place], [place] [address], telephone [phone]) [phone], or [address], [address], [place], [place] [address], telephone [phone].    For assistance with Workers’ Compensation Issues: You may contact the [facility] Office for Consumer Health Assistance, [address], [address], [place], [place] [address], Toll Free 1-341.422.6522, Web site: http://[facility].[place].gov/Programs/MYRA E-mail: myra@[facility].[place].gov  D-2 (rev. 10/20)               __________________________________________________________________                                    _________________            Employee Name / Signature                                                                                                                            Date

## 2023-03-10 NOTE — ED NOTES
Pt placed on monitor, placed in position of comfort with blanket and pillow. Educated on call light use. No other needs at this time.

## 2023-03-11 NOTE — DISCHARGE INSTRUCTIONS
Use Ortho shoe for comfort.  As we talked about, the initial x-rays do not show a fracture, however sometimes this can be missed and if you are still hurting in a week's time you should follow-up for a repeat x-ray.    Keep record/journal of your blood pressures (grocery store, fire station, home cuff).  Take this with you to your doctor in 1-2 weeks to discuss the results and any needed intervention.

## 2023-03-11 NOTE — ED NOTES
Provided patient with post-op boot for comfort. Explained how to take the boot on and off. Patient understands.

## 2023-03-11 NOTE — ED NOTES
Pt provided discharge instructions and verbalizes understanding. No questions at this time. Pt is taken to lobby by wheelchair.

## 2023-10-05 ENCOUNTER — TELEPHONE (OUTPATIENT)
Dept: HEALTH INFORMATION MANAGEMENT | Facility: OTHER | Age: 73
End: 2023-10-05
Payer: MEDICARE

## 2023-10-09 PROBLEM — M21.162 GENU VARUM OF BOTH LOWER EXTREMITIES: Status: ACTIVE | Noted: 2023-10-09

## 2023-10-09 PROBLEM — E78.5 DYSLIPIDEMIA: Status: ACTIVE | Noted: 2023-10-09

## 2023-10-09 PROBLEM — E66.812 CLASS 2 OBESITY WITH ALVEOLAR HYPOVENTILATION, SERIOUS COMORBIDITY, AND BODY MASS INDEX (BMI) OF 35.0 TO 35.9 IN ADULT (HCC): Status: ACTIVE | Noted: 2023-10-09

## 2023-10-09 PROBLEM — J45.20 MILD INTERMITTENT ASTHMA WITHOUT COMPLICATION: Status: ACTIVE | Noted: 2023-10-09

## 2023-10-09 PROBLEM — E66.9 OBESITY (BMI 30-39.9): Status: ACTIVE | Noted: 2023-10-09

## 2023-10-09 PROBLEM — M21.161 GENU VARUM OF BOTH LOWER EXTREMITIES: Status: ACTIVE | Noted: 2023-10-09

## 2023-10-09 PROBLEM — Z86.12 HISTORY OF POLIOMYELITIS: Status: ACTIVE | Noted: 2023-10-09

## 2023-10-09 PROBLEM — E66.09 OBESITY DUE TO EXCESS CALORIES WITH SERIOUS COMORBIDITY: Status: ACTIVE | Noted: 2023-10-09

## 2023-10-09 PROBLEM — E66.2 CLASS 2 OBESITY WITH ALVEOLAR HYPOVENTILATION, SERIOUS COMORBIDITY, AND BODY MASS INDEX (BMI) OF 35.0 TO 35.9 IN ADULT (HCC): Status: ACTIVE | Noted: 2023-10-09

## 2023-10-09 PROBLEM — E66.01 CLASS 2 SEVERE OBESITY DUE TO EXCESS CALORIES WITH SERIOUS COMORBIDITY AND BODY MASS INDEX (BMI) OF 35.0 TO 35.9 IN ADULT (HCC): Status: ACTIVE | Noted: 2023-10-09

## 2023-10-09 PROBLEM — Z87.01 HISTORY OF PNEUMONIA, RECURRENT: Status: ACTIVE | Noted: 2023-10-09

## 2023-10-09 PROBLEM — E66.812 CLASS 2 SEVERE OBESITY WITH BODY MASS INDEX (BMI) OF 35 TO 39.9 WITH SERIOUS COMORBIDITY (HCC): Status: ACTIVE | Noted: 2023-10-09

## 2023-10-09 PROBLEM — I10 PRIMARY HYPERTENSION: Status: ACTIVE | Noted: 2023-10-09

## 2023-10-09 PROBLEM — K21.9 GERD (GASTROESOPHAGEAL REFLUX DISEASE): Status: ACTIVE | Noted: 2023-10-09

## 2023-10-09 PROBLEM — E66.01 CLASS 2 SEVERE OBESITY WITH BODY MASS INDEX (BMI) OF 35 TO 39.9 WITH SERIOUS COMORBIDITY (HCC): Status: ACTIVE | Noted: 2023-10-09

## 2023-10-09 PROBLEM — Z86.718 HISTORY OF DVT (DEEP VEIN THROMBOSIS): Status: ACTIVE | Noted: 2020-12-29

## 2024-07-31 ENCOUNTER — HOSPITAL ENCOUNTER (OUTPATIENT)
Dept: RADIOLOGY | Facility: MEDICAL CENTER | Age: 74
End: 2024-07-31
Attending: FAMILY MEDICINE
Payer: MEDICARE

## 2024-07-31 DIAGNOSIS — M25.512 LEFT SHOULDER PAIN, UNSPECIFIED CHRONICITY: ICD-10-CM

## 2024-07-31 DIAGNOSIS — R20.2 PARESTHESIA: ICD-10-CM

## 2024-07-31 DIAGNOSIS — M62.81 MUSCLE WEAKNESS (GENERALIZED): ICD-10-CM

## 2024-07-31 PROCEDURE — 72040 X-RAY EXAM NECK SPINE 2-3 VW: CPT

## 2024-07-31 PROCEDURE — 73030 X-RAY EXAM OF SHOULDER: CPT | Mod: LT

## 2024-11-01 ENCOUNTER — TELEPHONE (OUTPATIENT)
Dept: HEALTH INFORMATION MANAGEMENT | Facility: OTHER | Age: 74
End: 2024-11-01
Payer: MEDICARE

## 2025-03-20 ENCOUNTER — PATIENT MESSAGE (OUTPATIENT)
Dept: HEALTH INFORMATION MANAGEMENT | Facility: OTHER | Age: 75
End: 2025-03-20

## 2025-06-04 NOTE — ASSESSMENT & PLAN NOTE
Chronic, stable. Currently taking losartan 100 mg daily. In-office blood pressure is 112/72. Denies chest pain, lightheadedness, and lower extremity edema. Continue to follow with primary care provider for medication and symptom management.

## 2025-06-04 NOTE — ASSESSMENT & PLAN NOTE
Chronic, ongoing. Reviewed most recent lipid profile from 2021. No current lipid-lowering medication use. Discussed limiting intake of processed foods and saturated fats. Denies chest pain and claudication. Routinely monitored by primary care provider.        Component  Ref Range & Units 3 yr ago   Cholesterol,Tot 222 High    Triglycerides 213 High    HDL 45    High    Resulting Agency M

## 2025-06-04 NOTE — ASSESSMENT & PLAN NOTE
Chronic, stable. Reports that symptoms are well-controlled with flovent twice daily and albuterol and duoneb as needed. Reports some dyspnea on exertion. Denies recent exacerbation of symptoms and shortness of breath. Managed by primary care provider.

## 2025-06-09 ENCOUNTER — APPOINTMENT (OUTPATIENT)
Dept: MEDICAL GROUP | Facility: MEDICAL CENTER | Age: 75
End: 2025-06-09
Attending: FAMILY MEDICINE
Payer: MEDICARE

## 2025-06-09 VITALS
BODY MASS INDEX: 36.59 KG/M2 | HEART RATE: 92 BPM | WEIGHT: 219.6 LBS | OXYGEN SATURATION: 94 % | HEIGHT: 65 IN | SYSTOLIC BLOOD PRESSURE: 112 MMHG | DIASTOLIC BLOOD PRESSURE: 72 MMHG

## 2025-06-09 DIAGNOSIS — Z12.31 SCREENING MAMMOGRAM, ENCOUNTER FOR: ICD-10-CM

## 2025-06-09 DIAGNOSIS — I10 PRIMARY HYPERTENSION: ICD-10-CM

## 2025-06-09 DIAGNOSIS — Z00.00 PREVENTATIVE HEALTH CARE: ICD-10-CM

## 2025-06-09 DIAGNOSIS — E66.812 CLASS 2 SEVERE OBESITY DUE TO EXCESS CALORIES WITH SERIOUS COMORBIDITY AND BODY MASS INDEX (BMI) OF 36.0 TO 36.9 IN ADULT (HCC): ICD-10-CM

## 2025-06-09 DIAGNOSIS — J45.20 MILD INTERMITTENT ASTHMA WITHOUT COMPLICATION: ICD-10-CM

## 2025-06-09 DIAGNOSIS — E66.01 CLASS 2 SEVERE OBESITY DUE TO EXCESS CALORIES WITH SERIOUS COMORBIDITY AND BODY MASS INDEX (BMI) OF 36.0 TO 36.9 IN ADULT (HCC): ICD-10-CM

## 2025-06-09 DIAGNOSIS — E78.5 DYSLIPIDEMIA: ICD-10-CM

## 2025-06-09 PROBLEM — K21.9 GERD (GASTROESOPHAGEAL REFLUX DISEASE): Status: RESOLVED | Noted: 2023-10-09 | Resolved: 2025-06-09

## 2025-06-09 PROCEDURE — 3074F SYST BP LT 130 MM HG: CPT

## 2025-06-09 PROCEDURE — 3078F DIAST BP <80 MM HG: CPT

## 2025-06-09 PROCEDURE — G0439 PPPS, SUBSEQ VISIT: HCPCS

## 2025-06-09 PROCEDURE — 1125F AMNT PAIN NOTED PAIN PRSNT: CPT

## 2025-06-09 SDOH — ECONOMIC STABILITY: HOUSING INSECURITY: IN THE LAST 12 MONTHS, WAS THERE A TIME WHEN YOU WERE NOT ABLE TO PAY THE MORTGAGE OR RENT ON TIME?: NO

## 2025-06-09 SDOH — ECONOMIC STABILITY: FOOD INSECURITY: HOW HARD IS IT FOR YOU TO PAY FOR THE VERY BASICS LIKE FOOD, HOUSING, MEDICAL CARE, AND HEATING?: NOT HARD AT ALL

## 2025-06-09 SDOH — ECONOMIC STABILITY: FOOD INSECURITY: WITHIN THE PAST 12 MONTHS, THE FOOD YOU BOUGHT JUST DIDN'T LAST AND YOU DIDN'T HAVE MONEY TO GET MORE.: NEVER TRUE

## 2025-06-09 SDOH — ECONOMIC STABILITY: HOUSING INSECURITY: AT ANY TIME IN THE PAST 12 MONTHS, WERE YOU HOMELESS OR LIVING IN A SHELTER (INCLUDING NOW)?: NO

## 2025-06-09 SDOH — ECONOMIC STABILITY: FOOD INSECURITY: WITHIN THE PAST 12 MONTHS, YOU WORRIED THAT YOUR FOOD WOULD RUN OUT BEFORE YOU GOT THE MONEY TO BUY MORE.: NEVER TRUE

## 2025-06-09 SDOH — ECONOMIC STABILITY: TRANSPORTATION INSECURITY: IN THE PAST 12 MONTHS, HAS LACK OF TRANSPORTATION KEPT YOU FROM MEDICAL APPOINTMENTS OR FROM GETTING MEDICATIONS?: NO

## 2025-06-09 ASSESSMENT — ENCOUNTER SYMPTOMS: GENERAL WELL-BEING: GOOD

## 2025-06-09 ASSESSMENT — ACTIVITIES OF DAILY LIVING (ADL)
BATHING_REQUIRES_ASSISTANCE: 0
LACK_OF_TRANSPORTATION: NO

## 2025-06-09 ASSESSMENT — PAIN SCALES - GENERAL: PAINLEVEL_OUTOF10: 4=SLIGHT-MODERATE PAIN

## 2025-06-09 ASSESSMENT — PATIENT HEALTH QUESTIONNAIRE - PHQ9: CLINICAL INTERPRETATION OF PHQ2 SCORE: 0

## 2025-06-09 NOTE — ASSESSMENT & PLAN NOTE
Chronic, ongoing. BMI is 36.54 kg/m2. Provided education on following heart healthy diet with adequate intake of fruits, vegetables, and whole grains. Encourage 30 minutes of moderate exercise 3-4 times a week. Declined [facility] gym resources at this time.

## 2025-06-09 NOTE — PROGRESS NOTES
Comprehensive Health Assessment Program     Cate Niño is a 74 y.o. here for her comprehensive health assessment.    Patient Active Problem List    Diagnosis Date Noted    Screening mammogram, encounter for 06/09/2025    Preventative health care 06/09/2025    History of poliomyelitis 10/09/2023    Genu varum of both lower extremities 10/09/2023    Mild intermittent asthma without complication 10/09/2023    History of pneumonia, recurrent 10/09/2023    Class 2 severe obesity due to excess calories with serious comorbidity and body mass index (BMI) of 36.0 to 36.9 in adult (HCC) 10/09/2023    Primary hypertension 10/09/2023    Dyslipidemia 10/09/2023    BMI 36.0-36.9,adult 10/09/2023    History of DVT (deep vein thrombosis) 12/29/2020       Current Medications[1]       Current supplements as per medication list.     Allergies:   Bee venom  Social History[2]  History reviewed. No pertinent family history.  Cate  has no past medical history on file.   Past Surgical History[3]    Screening:  In the last six months have you experienced any leakage of urine? Yes, reports leakage with use of absorbent pads.    Depression Screening  Little interest or pleasure in doing things?  0 - not at all  Feeling down, depressed , or hopeless? 0 - not at all  Patient Health Questionnaire Score: 0     If depressive symptoms identified deferred to follow up visit unless specifically addressed in assessment and plan.    Interpretation of PHQ-9 Total Score   Score Severity   1-4 No Depression   5-9 Mild Depression   10-14 Moderate Depression   15-19 Moderately Severe Depression   20-27 Severe Depression    Screening for Cognitive Impairment  Do you or any of your friends or family members have any concern about your memory? No  Three Minute Recall (Village, Kitchen, Baby) 3/3    Jimmy clock face with all 12 numbers and set the hands to show 10 minutes past 11.  Yes 5/5  Cognitive concerns identified deferred for follow up  unless specifically addressed in assessment and plan.    Fall Risk Assessment  Has the patient had two or more falls in the last year or any fall with injury in the last year?  No    Safety Assessment  Do you always wear your seatbelt?  Yes  Any changes to home needed to function safely? No  Difficulty hearing.  Yes  Patient counseled about all safety risks that were identified.    Functional Assessment ADLs  Are there any barriers preventing you from cooking for yourself or meeting nutritional needs?  No.    Are there any barriers preventing you from driving safely or obtaining transportation?  No.    Are there any barriers preventing you from using a telephone or calling for help?  No    Are there any barriers preventing you from shopping?  No.    Are there any barriers preventing you from taking care of your own finances?  No    Are there any barriers preventing you from managing your medications?  No    Are there any barriers preventing you from showering, bathing or dressing yourself? No    Are there any barriers preventing you from doing housework or laundry? No  Are there any barriers preventing you from using the toilet?No  Are you currently engaging in any exercise or physical activity?  No.      Self-Assessment of Health  What is your perception of your health? Good    Do you sleep more than six hours a night? Yes    In the past 7 days, how much did pain keep you from doing your normal work? A lot Back pain, hx of Polio   Do you spend quality time with family or friends (virtually or in person)? Yes    Do you usually eat a heart healthy diet that constists of a variety of fruits, vegetables, whole grains and fiber? Yes    Do you eat foods high in fat and/or Fast Food more than three times per week? Yes    How concerned are you that your medical conditions are not being well managed? a little    Are you worried that in the next 2 months, you may not have stable housing that you own, rent, or stay in as part  of a household? No      Advance Care Planning  Do you have an Advance Directive, Living Will, Durable Power of , or POLST? Yes  Advance Directive       is not on file - instructed patient to bring in a copy to scan into their chart      Health Maintenance Summary            Current Care Gaps       Hepatitis C Screening (Once) Never done     No completion history exists for this topic.              Zoster (Shingles) Vaccines (1 of 2) Never done     No completion history exists for this topic.              COVID-19 Vaccine (1 - 2024-25 season) Never done     No completion history exists for this topic.                      Needs Review       Bone Density Scan (Every 5 Years) Tentatively due on 1/6/2026 01/06/2021  DS-BONE DENSITY STUDY (DEXA)    10/05/2018  DS-BONE DENSITY STUDY (DEXA)              Colorectal Cancer Screening (Colon Cancer Screening Cologuard Stool (FIT DNA) - Every 3 Years) Tentatively due on 11/28/2026 11/28/2023  COLOGUARD RESULT component of LAB COLOGUARD® COLON CANCER SCREEN    10/08/2019  Noninvasive Colorectal Cancer DNA and Occult Blood Screening component of COLOGUARD COLON CANCER SCREENING                      Awaiting Completion       Mammogram (Yearly) Order placed this encounter      06/09/2025  Order placed for MA-SCREENING MAMMO BILAT W/TOMOSYNTHESIS W/CAD by Lilian Limon, FALGUNIPJulietteRJulietteN.    11/15/2021  MA-SCREENING MAMMO BILAT W/TOMOSYNTHESIS W/CAD    10/08/2020  MA-SCREENING MAMMO BILAT W/TOMOSYNTHESIS W/CAD    10/04/2019  MA-SCREENING MAMMO BILAT W/TOMOSYNTHESIS W/CAD    10/05/2018  MA-DIAGNOSTIC DIGITAL MAMMO-UNILAT LEFT     Only the first 5 history entries have been loaded, but more history exists.                    Upcoming       Influenza Vaccine (Season Ended) Next due on 9/1/2025      10/08/2014  Imm Admin: Influenza (IM) Preservative Free - HISTORICAL DATA    10/12/2012  Imm Admin: Influenza Seasonal Injectable - Historical Data    10/28/2011  Imm Admin:  Influenza (IM) Preservative Free - HISTORICAL DATA    10/27/2009  Imm Admin: Influenza (IM) Preservative Free - HISTORICAL DATA              IMM DTaP/Tdap/Td Vaccine (2 - Td or Tdap) Next due on 7/9/2030 07/09/2020  Imm Admin: Tdap Vaccine                      Completed or No Longer Recommended       Pneumococcal Vaccine: 50+ Years (Series Information) Completed      09/05/2018  Imm Admin: Pneumococcal polysaccharide vaccine (PPSV-23)    07/25/2017  Imm Admin: Pneumococcal Conjugate Vaccine (Prevnar/PCV-13)    12/28/2010  Imm Admin: Pneumococcal polysaccharide vaccine (PPSV-23)              Hepatitis A Vaccine (Hep A) (Series Information) Aged Out      No completion history exists for this topic.              Hepatitis B Vaccine (Hep B) (Series Information) Aged Out     No completion history exists for this topic.              HPV Vaccines (Series Information) Aged Out     No completion history exists for this topic.              Polio Vaccine (Inactivated Polio) (Series Information) Aged Out     No completion history exists for this topic.              Meningococcal Immunization (Series Information) Aged Out     No completion history exists for this topic.              Meningococcal B Vaccine (Series Information) Aged Out     No completion history exists for this topic.              Cervical Cancer Screening  Discontinued        Frequency changed to Never automatically (Topic No Longer Applies)    07/25/2017  THINPREP PAP, REFLEX HPV ON ASC-US AND ABOVE    07/25/2017  PATHOLOGY GYN SPECIMEN              Annual Wellness Visit  Discontinued        Frequency changed to Never automatically (Topic No Longer Applies)    06/09/2025  Level of Service: [place] ANNUAL WELLNESS VISIT-INCLUDES PPPS SUBSEQUE*    10/09/2023  Level of Service: ANNUAL WELLNESS VISIT-INCLUDES PPPS SUBSEQUE*                            Patient Care Team:  Beth Muse M.D. as PCP - General (Family Medicine)  Beth Muse M.D. as PCP -  "[facility]  [facility]      Financial Resource Strain: Low Risk  (6/9/2025)    Overall Financial Resource Strain (CARDIA)     Difficulty of Paying Living Expenses: Not hard at all      Transportation Needs: No Transportation Needs (6/9/2025)    PRAPARE - Transportation     Lack of Transportation (Medical): No     Lack of Transportation (Non-Medical): No      Food Insecurity: No Food Insecurity (6/9/2025)    Hunger Vital Sign     Worried About Running Out of Food in the Last Year: Never true     Ran Out of Food in the Last Year: Never true        Encounter Vitals  Blood Pressure : 112/72  Pulse: 92  Pulse Oximetry: 94 %  Weight: 99.6 kg (219 lb 9.6 oz)  Height: 165.1 cm (5' 5\")  BMI (Calculated): 36.54  Pain Score: 4=Slight-Moderate Pain     ROS:  No fever, chills, nausea, vomiting, diarrhea, chest pain or shortness of breath. See HPI.    Physical Exam  Vitals reviewed.   Constitutional:       Appearance: Normal appearance. She is obese.   Cardiovascular:      Rate and Rhythm: Normal rate and regular rhythm.      Pulses: Normal pulses.      Heart sounds: Normal heart sounds.   Pulmonary:      Effort: Pulmonary effort is normal.      Breath sounds: Normal breath sounds.   Skin:     General: Skin is warm and dry.      Capillary Refill: Capillary refill takes less than 2 seconds.   Neurological:      General: No focal deficit present.      Mental Status: She is alert and oriented to person, place, and time.   Psychiatric:         Mood and Affect: Mood normal.       Assessment and Plan. The following treatment and monitoring plan is recommended:    Class 2 severe obesity due to excess calories with serious comorbidity and body mass index (BMI) of 36.0 to 36.9 in adult (HCC)  Chronic, ongoing. BMI is 36.54 kg/m2. Provided education on following heart healthy diet with adequate intake of fruits, vegetables, and whole grains. Encourage 30 minutes of moderate exercise 3-4 times a week. Declined Senior [employer] gym resources at this time.    Dyslipidemia  Chronic, ongoing. Reviewed most recent lipid profile from 2021. No current lipid-lowering medication use. Discussed limiting intake of processed foods and saturated fats. Denies chest pain and claudication. Monitored by primary care provider.        Component  Ref Range & Units 3 yr ago   Cholesterol,Tot 222 High    Triglycerides 213 High    HDL 45    High    Resulting Agency M        Mild intermittent asthma without complication  Chronic, stable. Reports that symptoms are well-controlled with flovent twice daily and albuterol and duoneb as needed. Reports some dyspnea on exertion. Denies recent exacerbation of symptoms. Managed by primary care provider.    Primary hypertension  Chronic, stable. Currently taking losartan 100 mg daily. In-office blood pressure is 112/72. Denies chest pain, lightheadedness, and lower extremity edema. Continue to follow with primary care provider for medication and symptom management.    Preventative health care  --Basic Metabolic Panel  --CBC WITHOUT DIFFERENTIAL  --HEMOGLOBIN A1C  --Lipid Profile  --TSH WITH REFLEX TO FT4    Screening mammogram, encounter for  Mammogram ordered.       Services suggested: No services needed at this time  Health Care Screening: Age-appropriate preventive services recommended by USPTF and ACIP covered by Medicare were discussed today. Services ordered if indicated and agreed upon by the patient.  Referrals offered: Community-based lifestyle interventions to reduce health risks and promote self-management and wellness, fall prevention, nutrition, physical activity, tobacco-use cessation, weight loss, and mental health services as per orders if indicated.    Discussion today about general wellness and lifestyle habits:    Prevent falls and reduce trip hazards; Cautioned about securing or removing rugs.  Have a working fire alarm and carbon monoxide detector.  Engage in regular physical activity  and social activities.    Follow-up: Return for appointment with Primary Care Provider as needed.              [1]   Current Outpatient Medications   Medication Sig Dispense Refill    albuterol (PROVENTIL) 2.5mg/3ml Nebu Soln solution for nebulization Take 3 mL by nebulization every four hours as needed.      albuterol 108 (90 Base) MCG/ACT Aero Soln inhalation aerosol Inhale 2 Puffs every 4 hours.      ibuprofen (MOTRIN) 800 MG Tab Take 800 mg by mouth 3 times a day as needed for NPASS greater than 3 (pain).      losartan (COZAAR) 100 MG Tab Take 100 mg by mouth every day.      Zinc 100 MG Tab Take 1 tablet by mouth every day.      therapeutic multivitamin-minerals (THERAGRAN-M) Tab Take 1 tablet by mouth every day.      Ascorbic Acid (VITAMIN C) 1000 MG Tab Take 1 tablet by mouth every day.      vitamin D (CHOLECALCIFEROL) 1000 Unit (25 mcg) Tab Take 1,000 Units by mouth every day.      Potassium 99 MG Tab Take 1 tablet by mouth every day.      aspirin (ASA) 81 MG Chew Tab chewable tablet Chew 81 mg every day.      ipratropium-albuterol (DUONEB) 0.5-2.5 (3) MG/3ML nebulizer solution 3 mL by Nebulization route 4 times a day.      fluticasone (FLOVENT HFA) 110 MCG/ACT AERO Inhale 2 Puffs by mouth 2 times a day.      Garlic 100 MG Tab Take 1 tablet by mouth every day. (Patient not taking: Reported on 6/9/2025)      Coconut Oil Oil Take 1 tablet by mouth every day. (Patient not taking: Reported on 6/9/2025)       No current facility-administered medications for this visit.   [2]   Social History  Tobacco Use    Smoking status: Never    Smokeless tobacco: Never   Vaping Use    Vaping status: Never Used   Substance Use Topics    Alcohol use: Not Currently     Comment: 2/year    Drug use: No   [3] History reviewed. No pertinent surgical history.

## 2025-06-09 NOTE — ASSESSMENT & PLAN NOTE
--Basic Metabolic Panel  --CBC WITHOUT DIFFERENTIAL  --HEMOGLOBIN A1C  --Lipid Profile  --TSH WITH REFLEX TO FT4

## 2025-06-19 DIAGNOSIS — Z00.6 CLINICAL TRIAL PARTICIPANT: ICD-10-CM

## 2025-06-24 ENCOUNTER — HOSPITAL ENCOUNTER (OUTPATIENT)
Dept: LAB | Facility: MEDICAL CENTER | Age: 75
End: 2025-06-24
Attending: FAMILY MEDICINE

## 2025-06-24 ENCOUNTER — HOSPITAL ENCOUNTER (OUTPATIENT)
Dept: LAB | Facility: MEDICAL CENTER | Age: 75
End: 2025-06-24
Payer: MEDICARE

## 2025-06-24 ENCOUNTER — RESULTS FOLLOW-UP (OUTPATIENT)
Dept: MEDICAL GROUP | Facility: MEDICAL CENTER | Age: 75
End: 2025-06-24

## 2025-06-24 DIAGNOSIS — Z00.00 PREVENTATIVE HEALTH CARE: ICD-10-CM

## 2025-06-24 DIAGNOSIS — Z00.6 CLINICAL TRIAL PARTICIPANT: ICD-10-CM

## 2025-06-24 LAB
ANION GAP SERPL CALC-SCNC: 11 MMOL/L (ref 7–16)
BUN SERPL-MCNC: 12 MG/DL (ref 8–22)
CALCIUM SERPL-MCNC: 9.2 MG/DL (ref 8.5–10.5)
CHLORIDE SERPL-SCNC: 106 MMOL/L (ref 96–112)
CHOLEST SERPL-MCNC: 205 MG/DL (ref 100–199)
CO2 SERPL-SCNC: 25 MMOL/L (ref 20–33)
CREAT SERPL-MCNC: 0.69 MG/DL (ref 0.5–1.4)
ERYTHROCYTE [DISTWIDTH] IN BLOOD BY AUTOMATED COUNT: 42.6 FL (ref 35.9–50)
EST. AVERAGE GLUCOSE BLD GHB EST-MCNC: 123 MG/DL
GFR SERPLBLD CREATININE-BSD FMLA CKD-EPI: 91 ML/MIN/1.73 M 2
GLUCOSE SERPL-MCNC: 95 MG/DL (ref 65–99)
HBA1C MFR BLD: 5.9 % (ref 4–5.6)
HCT VFR BLD AUTO: 44.7 % (ref 37–47)
HDLC SERPL-MCNC: 43 MG/DL
HGB BLD-MCNC: 14.5 G/DL (ref 12–16)
LDLC SERPL CALC-MCNC: 139 MG/DL
MCH RBC QN AUTO: 30.9 PG (ref 27–33)
MCHC RBC AUTO-ENTMCNC: 32.4 G/DL (ref 32.2–35.5)
MCV RBC AUTO: 95.3 FL (ref 81.4–97.8)
PLATELET # BLD AUTO: 237 K/UL (ref 164–446)
PMV BLD AUTO: 9.5 FL (ref 9–12.9)
POTASSIUM SERPL-SCNC: 4.4 MMOL/L (ref 3.6–5.5)
RBC # BLD AUTO: 4.69 M/UL (ref 4.2–5.4)
SODIUM SERPL-SCNC: 142 MMOL/L (ref 135–145)
TRIGL SERPL-MCNC: 116 MG/DL (ref 0–149)
TSH SERPL DL<=0.005 MIU/L-ACNC: 3.28 UIU/ML (ref 0.38–5.33)
WBC # BLD AUTO: 5.7 K/UL (ref 4.8–10.8)

## 2025-06-24 PROCEDURE — 84443 ASSAY THYROID STIM HORMONE: CPT

## 2025-06-24 PROCEDURE — 85027 COMPLETE CBC AUTOMATED: CPT

## 2025-06-24 PROCEDURE — 80061 LIPID PANEL: CPT

## 2025-06-24 PROCEDURE — 36415 COLL VENOUS BLD VENIPUNCTURE: CPT

## 2025-06-24 PROCEDURE — 83036 HEMOGLOBIN GLYCOSYLATED A1C: CPT

## 2025-06-24 PROCEDURE — 80048 BASIC METABOLIC PNL TOTAL CA: CPT

## 2025-06-27 LAB
ELF SCORE: 10.45 -
HA (HYALURONIC ACID): 162.54 NG/ML
PIIINP (AMINO-TERMINAL PROPEPTIDE): 9.13 NG/ML
RELATIVE RISK: ABNORMAL
RISK GROUP: ABNORMAL
RISK: 23.6 %
TIMP-1 (TISSUE INHIBITOR OF MMP1): 254.8 NG/ML

## 2025-06-30 ENCOUNTER — RESULTS FOLLOW-UP (OUTPATIENT)
Dept: MEDICAL GROUP | Facility: PHYSICIAN GROUP | Age: 75
End: 2025-06-30
Payer: MEDICARE

## 2025-07-03 ENCOUNTER — HOSPITAL ENCOUNTER (OUTPATIENT)
Dept: RADIOLOGY | Facility: MEDICAL CENTER | Age: 75
End: 2025-07-03
Payer: MEDICARE

## 2025-07-03 DIAGNOSIS — Z12.31 SCREENING MAMMOGRAM, ENCOUNTER FOR: ICD-10-CM

## 2025-07-03 PROCEDURE — 77063 BREAST TOMOSYNTHESIS BI: CPT

## 2025-07-04 LAB
APOB+LDLR+PCSK9 GENE MUT ANL BLD/T: NOT DETECTED
BRCA1+BRCA2 DEL+DUP + FULL MUT ANL BLD/T: NOT DETECTED
MLH1+MSH2+MSH6+PMS2 GN DEL+DUP+FUL M: NOT DETECTED